# Patient Record
Sex: FEMALE | Race: WHITE | Employment: OTHER | URBAN - METROPOLITAN AREA
[De-identification: names, ages, dates, MRNs, and addresses within clinical notes are randomized per-mention and may not be internally consistent; named-entity substitution may affect disease eponyms.]

---

## 2018-05-28 PROBLEM — J96.00 ACUTE RESPIRATORY FAILURE (HCC): Status: ACTIVE | Noted: 2018-05-28

## 2018-06-01 ENCOUNTER — HOSPITAL ENCOUNTER (OUTPATIENT)
Dept: OTHER | Age: 73
Discharge: HOME | End: 2018-06-01
Attending: INTERNAL MEDICINE | Admitting: INTERNAL MEDICINE

## 2018-06-19 LAB
T3 FREE: 2.5 PG/ML (ref 2.3–4.2)
TSH HIGH SENSITIVITY: 2.22 UIU/ML (ref 0.27–4.2)

## 2018-06-20 LAB — T4 TOTAL: 6.23 UG/DL

## 2018-07-02 ENCOUNTER — OUTSIDE SERVICES (OUTPATIENT)
Dept: INTERNAL MEDICINE CLINIC | Age: 73
End: 2018-07-02

## 2018-07-02 DIAGNOSIS — Z79.4 TYPE 2 DIABETES MELLITUS WITH COMPLICATION, WITH LONG-TERM CURRENT USE OF INSULIN (HCC): Primary | ICD-10-CM

## 2018-07-02 DIAGNOSIS — E11.8 TYPE 2 DIABETES MELLITUS WITH COMPLICATION, WITH LONG-TERM CURRENT USE OF INSULIN (HCC): Primary | ICD-10-CM

## 2018-07-02 PROCEDURE — 99308 SBSQ NF CARE LOW MDM 20: CPT | Performed by: INTERNAL MEDICINE

## 2018-07-02 NOTE — PROGRESS NOTES
PROGRESS NOTE  ___________________________    Vivienne Rodriguez's  is 1945  SEEN ON 2018  ___________________________    S:   Patient is seen today and discussed with the nurses. Nurses called to report that patient fasting blood sugars are low on 2018 blood sugar was 56, on 2018 it was 69. Other times it was 65 and 67. Patient is on Tradjenta 5 mg daily, metformin 500 mg twice a day and insulin glargine 30 units at bedtime      Vital signs: stable as per [facility]  GENERAL:  Alert, oriented, pleasant, in no apparent distress. HEENT:  Conjunctiva pink, no scleral icterus. ENT clear. NECK:  Supple. No jugular venous distention noted. No masses felt,  CARDIOVASCULAR:  Normal S1 and S2    PULMONARY:  No respiratory distress. No wheezes or rales. ABDOMEN:  Soft and non-tender,no masses  or organomegaly. EXTREMITIES:  No cyanosis, clubbing, or significant edema. SKIN: Skin is warm and dry. NEUROLOGICAL:  Cranial nerves II through XII are grossly intact. Assessment:  -Diabetes mellitus on insulin and oral medications   -Hypoglycemia in the early hours   -Hyperlipidemia   -History of CVA        Plan:  Medication were reviewed  Continue patient on metformin and Tradjenta right now  Decrease insulin glargine from 30 units to 20 units at at bedtime  Continue to monitor blood sugars frequently  Continue rest of the medicine treatment.

## 2018-08-01 ENCOUNTER — HOSPITAL ENCOUNTER (OUTPATIENT)
Dept: OTHER | Age: 73
Discharge: HOME | End: 2018-08-31
Attending: INTERNAL MEDICINE | Admitting: INTERNAL MEDICINE

## 2018-08-07 LAB
BACTERIA: NEGATIVE /HPF
BILIRUBIN URINE: ABNORMAL MG/DL
BLOOD, URINE: NEGATIVE
CALCIUM OXALATE CRYSTALS: ABNORMAL /HPF
CLARITY: ABNORMAL
COLOR: YELLOW
CULTURE: NORMAL
GLUCOSE, URINE: 50 MG/DL
ICTOTEST: NEGATIVE
KETONES, URINE: NEGATIVE MG/DL
LEUKOCYTE ESTERASE, URINE: NEGATIVE
Lab: NORMAL
MUCUS: ABNORMAL HPF
NITRITE URINE, QUANTITATIVE: NEGATIVE
PH, URINE: 5 (ref 5–8)
PROTEIN UA: NEGATIVE MG/DL
RBC URINE: ABNORMAL /HPF (ref 0–6)
REPORT STATUS: NORMAL
SPECIFIC GRAVITY UA: 1.03 (ref 1–1.03)
SPECIMEN: NORMAL
SQUAMOUS EPITHELIAL: 1 /HPF
TOTAL COLONY COUNT: NORMAL
TRICHOMONAS: ABNORMAL /HPF
UROBILINOGEN, URINE: NORMAL MG/DL (ref 0.2–1)
WBC UA: ABNORMAL /HPF (ref 0–5)

## 2018-09-01 ENCOUNTER — HOSPITAL ENCOUNTER (OUTPATIENT)
Dept: OTHER | Age: 73
Discharge: HOME | End: 2018-09-01
Attending: INTERNAL MEDICINE | Admitting: INTERNAL MEDICINE

## 2018-09-11 LAB
ALBUMIN SERPL-MCNC: 3.4 GM/DL (ref 3.4–5)
ALP BLD-CCNC: 68 IU/L (ref 40–128)
ALT SERPL-CCNC: 8 U/L (ref 10–40)
ANION GAP SERPL CALCULATED.3IONS-SCNC: 10 MMOL/L (ref 4–16)
AST SERPL-CCNC: 11 IU/L (ref 15–37)
BILIRUB SERPL-MCNC: 0.3 MG/DL (ref 0–1)
BUN BLDV-MCNC: 7 MG/DL (ref 6–23)
CALCIUM SERPL-MCNC: 8.6 MG/DL (ref 8.3–10.6)
CHLORIDE BLD-SCNC: 105 MMOL/L (ref 99–110)
CO2: 32 MMOL/L (ref 21–32)
CREAT SERPL-MCNC: 0.7 MG/DL (ref 0.6–1.1)
ESTIMATED AVERAGE GLUCOSE: 140 MG/DL
GFR AFRICAN AMERICAN: >60 ML/MIN/1.73M2
GFR NON-AFRICAN AMERICAN: >60 ML/MIN/1.73M2
GLUCOSE BLD-MCNC: 53 MG/DL (ref 70–99)
HBA1C MFR BLD: 6.5 % (ref 4.2–6.3)
HCT VFR BLD CALC: 40.1 % (ref 37–47)
HEMOGLOBIN: 12.5 GM/DL (ref 12.5–16)
MCH RBC QN AUTO: 31.5 PG (ref 27–31)
MCHC RBC AUTO-ENTMCNC: 31.2 % (ref 32–36)
MCV RBC AUTO: 101 FL (ref 78–100)
PDW BLD-RTO: 13.7 % (ref 11.7–14.9)
PLATELET # BLD: 258 K/CU MM (ref 140–440)
PMV BLD AUTO: 10.7 FL (ref 7.5–11.1)
POTASSIUM SERPL-SCNC: 3.3 MMOL/L (ref 3.5–5.1)
RBC # BLD: 3.97 M/CU MM (ref 4.2–5.4)
SODIUM BLD-SCNC: 147 MMOL/L (ref 135–145)
TOTAL PROTEIN: 5.3 GM/DL (ref 6.4–8.2)
WBC # BLD: 6.9 K/CU MM (ref 4–10.5)

## 2018-10-02 ENCOUNTER — HOSPITAL ENCOUNTER (OUTPATIENT)
Age: 73
Discharge: HOME | End: 2018-10-02
Payer: MEDICARE

## 2018-10-02 LAB
ANION GAP SERPL CALCULATED.3IONS-SCNC: 10 MMOL/L (ref 4–16)
BUN BLDV-MCNC: 7 MG/DL (ref 6–23)
CALCIUM SERPL-MCNC: 8.8 MG/DL (ref 8.3–10.6)
CHLORIDE BLD-SCNC: 105 MMOL/L (ref 99–110)
CO2: 30 MMOL/L (ref 21–32)
CREAT SERPL-MCNC: 0.8 MG/DL (ref 0.6–1.1)
GFR AFRICAN AMERICAN: >60 ML/MIN/1.73M2
GFR NON-AFRICAN AMERICAN: >60 ML/MIN/1.73M2
GLUCOSE BLD-MCNC: 73 MG/DL (ref 70–99)
POTASSIUM SERPL-SCNC: 3.9 MMOL/L (ref 3.5–5.1)
SODIUM BLD-SCNC: 145 MMOL/L (ref 135–145)

## 2018-10-02 PROCEDURE — 80048 BASIC METABOLIC PNL TOTAL CA: CPT

## 2018-10-02 PROCEDURE — 36415 COLL VENOUS BLD VENIPUNCTURE: CPT

## 2018-10-10 ENCOUNTER — HOSPITAL ENCOUNTER (OUTPATIENT)
Age: 73
Setting detail: SPECIMEN
Discharge: HOME | End: 2018-10-10
Payer: MEDICARE

## 2018-10-10 LAB
RAPID INFLUENZA  B AGN: NEGATIVE
RAPID INFLUENZA A AGN: NEGATIVE

## 2018-10-10 PROCEDURE — 87804 INFLUENZA ASSAY W/OPTIC: CPT

## 2018-10-19 ENCOUNTER — HOSPITAL ENCOUNTER (OUTPATIENT)
Age: 73
Discharge: HOME | End: 2018-10-19
Payer: MEDICARE

## 2018-10-19 LAB — LEGIONELLA URINARY AG: NEGATIVE

## 2018-10-19 PROCEDURE — 36415 COLL VENOUS BLD VENIPUNCTURE: CPT

## 2018-10-19 PROCEDURE — 87449 NOS EACH ORGANISM AG IA: CPT

## 2018-10-19 PROCEDURE — 86738 MYCOPLASMA ANTIBODY: CPT

## 2018-10-21 LAB
MYCOPLASMA PNEUMONIAE IGG: 0.17
MYCOPLASMA PNEUMONIAE IGM: 0.08

## 2018-12-11 ENCOUNTER — HOSPITAL ENCOUNTER (OUTPATIENT)
Age: 73
Setting detail: SPECIMEN
Discharge: HOME | End: 2018-12-11
Payer: COMMERCIAL

## 2018-12-11 LAB
ANION GAP SERPL CALCULATED.3IONS-SCNC: 13 MMOL/L (ref 4–16)
BUN BLDV-MCNC: 9 MG/DL (ref 6–23)
CALCIUM SERPL-MCNC: 8.6 MG/DL (ref 8.3–10.6)
CHLORIDE BLD-SCNC: 101 MMOL/L (ref 99–110)
CO2: 29 MMOL/L (ref 21–32)
CREAT SERPL-MCNC: 0.7 MG/DL (ref 0.6–1.1)
ESTIMATED AVERAGE GLUCOSE: 157 MG/DL
GFR AFRICAN AMERICAN: >60 ML/MIN/1.73M2
GFR NON-AFRICAN AMERICAN: >60 ML/MIN/1.73M2
GLUCOSE BLD-MCNC: 116 MG/DL (ref 70–99)
HBA1C MFR BLD: 7.1 % (ref 4.2–6.3)
POTASSIUM SERPL-SCNC: 3.7 MMOL/L (ref 3.5–5.1)
SODIUM BLD-SCNC: 143 MMOL/L (ref 135–145)

## 2018-12-11 PROCEDURE — 36415 COLL VENOUS BLD VENIPUNCTURE: CPT

## 2018-12-11 PROCEDURE — 80048 BASIC METABOLIC PNL TOTAL CA: CPT

## 2018-12-11 PROCEDURE — 83036 HEMOGLOBIN GLYCOSYLATED A1C: CPT

## 2018-12-12 ENCOUNTER — HOSPITAL ENCOUNTER (OUTPATIENT)
Age: 73
Setting detail: SPECIMEN
Discharge: HOME | End: 2018-12-12
Payer: COMMERCIAL

## 2018-12-12 PROCEDURE — 87071 CULTURE AEROBIC QUANT OTHER: CPT

## 2018-12-12 PROCEDURE — 87077 CULTURE AEROBIC IDENTIFY: CPT

## 2018-12-12 PROCEDURE — 87073 CULTURE BACTERIA ANAEROBIC: CPT

## 2018-12-12 PROCEDURE — 87186 SC STD MICRODIL/AGAR DIL: CPT

## 2018-12-16 LAB
CULTURE: NORMAL
Lab: NORMAL
ORGANISM: NORMAL
REPORT STATUS: NORMAL
SPECIMEN: NORMAL

## 2019-03-10 ENCOUNTER — HOSPITAL ENCOUNTER (OUTPATIENT)
Age: 74
Setting detail: SPECIMEN
Discharge: HOME | End: 2019-03-10
Payer: COMMERCIAL

## 2019-03-10 PROCEDURE — 87086 URINE CULTURE/COLONY COUNT: CPT

## 2019-03-11 LAB
CULTURE: ABNORMAL
Lab: ABNORMAL
SPECIMEN: ABNORMAL
TOTAL COLONY COUNT: ABNORMAL

## 2019-03-12 ENCOUNTER — HOSPITAL ENCOUNTER (OUTPATIENT)
Age: 74
Setting detail: SPECIMEN
Discharge: HOME | End: 2019-03-12
Payer: COMMERCIAL

## 2019-03-12 LAB
ANION GAP SERPL CALCULATED.3IONS-SCNC: 7 MMOL/L (ref 4–16)
BUN BLDV-MCNC: 8 MG/DL (ref 6–23)
CALCIUM SERPL-MCNC: 8.8 MG/DL (ref 8.3–10.6)
CHLORIDE BLD-SCNC: 105 MMOL/L (ref 99–110)
CO2: 34 MMOL/L (ref 21–32)
CREAT SERPL-MCNC: 0.7 MG/DL (ref 0.6–1.1)
ESTIMATED AVERAGE GLUCOSE: 183 MG/DL
GFR AFRICAN AMERICAN: >60 ML/MIN/1.73M2
GFR NON-AFRICAN AMERICAN: >60 ML/MIN/1.73M2
GLUCOSE BLD-MCNC: 98 MG/DL (ref 70–99)
HBA1C MFR BLD: 8 % (ref 4.2–6.3)
POTASSIUM SERPL-SCNC: 3.9 MMOL/L (ref 3.5–5.1)
SODIUM BLD-SCNC: 146 MMOL/L (ref 135–145)

## 2019-03-12 PROCEDURE — 83036 HEMOGLOBIN GLYCOSYLATED A1C: CPT

## 2019-03-12 PROCEDURE — 81001 URINALYSIS AUTO W/SCOPE: CPT

## 2019-03-12 PROCEDURE — 80048 BASIC METABOLIC PNL TOTAL CA: CPT

## 2019-03-12 PROCEDURE — 87186 SC STD MICRODIL/AGAR DIL: CPT

## 2019-03-12 PROCEDURE — 87077 CULTURE AEROBIC IDENTIFY: CPT

## 2019-03-12 PROCEDURE — 87086 URINE CULTURE/COLONY COUNT: CPT

## 2019-03-12 PROCEDURE — 36415 COLL VENOUS BLD VENIPUNCTURE: CPT

## 2019-03-16 LAB
CULTURE: ABNORMAL
Lab: ABNORMAL
SPECIMEN: ABNORMAL
TOTAL COLONY COUNT: ABNORMAL

## 2019-03-18 LAB
BACTERIA: ABNORMAL /HPF
BILIRUBIN URINE: NEGATIVE MG/DL
BLOOD, URINE: ABNORMAL
CLARITY: ABNORMAL
COLOR: YELLOW
GLUCOSE, URINE: >500 MG/DL
KETONES, URINE: NEGATIVE MG/DL
LEUKOCYTE ESTERASE, URINE: ABNORMAL
NITRITE URINE, QUANTITATIVE: NEGATIVE
PH, URINE: 5 (ref 5–8)
PROTEIN UA: NEGATIVE MG/DL
RBC URINE: 154 /HPF (ref 0–6)
SPECIFIC GRAVITY UA: 1.01 (ref 1–1.03)
SQUAMOUS EPITHELIAL: 8 /HPF
TRICHOMONAS: ABNORMAL /HPF
UROBILINOGEN, URINE: NORMAL MG/DL (ref 0.2–1)
WBC CLUMP: ABNORMAL /HPF
WBC UA: 192 /HPF (ref 0–5)
YEAST: ABNORMAL /HPF

## 2019-04-01 ENCOUNTER — HOSPITAL ENCOUNTER (OUTPATIENT)
Age: 74
Setting detail: SPECIMEN
Discharge: HOME | End: 2019-04-01
Payer: COMMERCIAL

## 2019-04-04 ENCOUNTER — HOSPITAL ENCOUNTER (OUTPATIENT)
Age: 74
Setting detail: SPECIMEN
Discharge: HOME | End: 2019-04-04
Payer: COMMERCIAL

## 2019-04-04 PROCEDURE — 87186 SC STD MICRODIL/AGAR DIL: CPT

## 2019-04-04 PROCEDURE — 87077 CULTURE AEROBIC IDENTIFY: CPT

## 2019-04-04 PROCEDURE — 87086 URINE CULTURE/COLONY COUNT: CPT

## 2019-04-04 PROCEDURE — 81001 URINALYSIS AUTO W/SCOPE: CPT

## 2019-04-05 LAB
BACTERIA: ABNORMAL /HPF
BILIRUBIN URINE: NEGATIVE MG/DL
BLOOD, URINE: ABNORMAL
CLARITY: ABNORMAL
COLOR: YELLOW
GLUCOSE, URINE: NEGATIVE MG/DL
KETONES, URINE: NEGATIVE MG/DL
LEUKOCYTE ESTERASE, URINE: ABNORMAL
MUCUS: ABNORMAL HPF
NITRITE URINE, QUANTITATIVE: NEGATIVE
PH, URINE: 5 (ref 5–8)
PROTEIN UA: NEGATIVE MG/DL
RBC URINE: 18 /HPF (ref 0–6)
SPECIFIC GRAVITY UA: 1.01 (ref 1–1.03)
SQUAMOUS EPITHELIAL: 4 /HPF
TRANSITIONAL EPITHELIAL: <1 /HPF
TRICHOMONAS: ABNORMAL /HPF
UROBILINOGEN, URINE: NORMAL MG/DL (ref 0.2–1)
WBC CLUMP: ABNORMAL /HPF
WBC UA: 46 /HPF (ref 0–5)

## 2019-04-06 LAB
CULTURE: ABNORMAL
Lab: ABNORMAL
SPECIMEN: ABNORMAL
TOTAL COLONY COUNT: ABNORMAL

## 2019-04-22 ENCOUNTER — HOSPITAL ENCOUNTER (OUTPATIENT)
Age: 74
Setting detail: SPECIMEN
Discharge: HOME | End: 2019-04-22
Payer: COMMERCIAL

## 2019-04-22 PROCEDURE — 87186 SC STD MICRODIL/AGAR DIL: CPT

## 2019-04-22 PROCEDURE — 87086 URINE CULTURE/COLONY COUNT: CPT

## 2019-04-22 PROCEDURE — 87077 CULTURE AEROBIC IDENTIFY: CPT

## 2019-04-25 LAB
CULTURE: ABNORMAL
Lab: ABNORMAL
SPECIMEN: ABNORMAL
TOTAL COLONY COUNT: ABNORMAL

## 2019-06-11 ENCOUNTER — HOSPITAL ENCOUNTER (OUTPATIENT)
Age: 74
Discharge: HOME | End: 2019-06-11
Payer: COMMERCIAL

## 2019-06-11 LAB
ANION GAP SERPL CALCULATED.3IONS-SCNC: 10 MMOL/L (ref 4–16)
BUN BLDV-MCNC: 9 MG/DL (ref 6–23)
CALCIUM SERPL-MCNC: 8.5 MG/DL (ref 8.3–10.6)
CHLORIDE BLD-SCNC: 107 MMOL/L (ref 99–110)
CO2: 27 MMOL/L (ref 21–32)
CREAT SERPL-MCNC: 0.8 MG/DL (ref 0.6–1.1)
ESTIMATED AVERAGE GLUCOSE: 180 MG/DL
GFR AFRICAN AMERICAN: >60 ML/MIN/1.73M2
GFR NON-AFRICAN AMERICAN: >60 ML/MIN/1.73M2
GLUCOSE BLD-MCNC: 161 MG/DL (ref 70–99)
HBA1C MFR BLD: 7.9 % (ref 4.2–6.3)
POTASSIUM SERPL-SCNC: 3.8 MMOL/L (ref 3.5–5.1)
SODIUM BLD-SCNC: 144 MMOL/L (ref 135–145)

## 2019-06-11 PROCEDURE — 36415 COLL VENOUS BLD VENIPUNCTURE: CPT

## 2019-06-11 PROCEDURE — 83036 HEMOGLOBIN GLYCOSYLATED A1C: CPT

## 2019-06-11 PROCEDURE — 80048 BASIC METABOLIC PNL TOTAL CA: CPT

## 2019-09-10 ENCOUNTER — HOSPITAL ENCOUNTER (OUTPATIENT)
Age: 74
Setting detail: SPECIMEN
Discharge: HOME | End: 2019-09-10
Payer: COMMERCIAL

## 2019-09-10 LAB
ALBUMIN SERPL-MCNC: 3.3 GM/DL (ref 3.4–5)
ALP BLD-CCNC: 68 IU/L (ref 40–128)
ALT SERPL-CCNC: 8 U/L (ref 10–40)
ANION GAP SERPL CALCULATED.3IONS-SCNC: 12 MMOL/L (ref 4–16)
AST SERPL-CCNC: 12 IU/L (ref 15–37)
BILIRUB SERPL-MCNC: 0.2 MG/DL (ref 0–1)
BUN BLDV-MCNC: 10 MG/DL (ref 6–23)
CALCIUM SERPL-MCNC: 8.8 MG/DL (ref 8.3–10.6)
CHLORIDE BLD-SCNC: 105 MMOL/L (ref 99–110)
CO2: 29 MMOL/L (ref 21–32)
CREAT SERPL-MCNC: 0.8 MG/DL (ref 0.6–1.1)
ESTIMATED AVERAGE GLUCOSE: 163 MG/DL
GFR AFRICAN AMERICAN: >60 ML/MIN/1.73M2
GFR NON-AFRICAN AMERICAN: >60 ML/MIN/1.73M2
GLUCOSE BLD-MCNC: 118 MG/DL (ref 70–99)
HBA1C MFR BLD: 7.3 % (ref 4.2–6.3)
HCT VFR BLD CALC: 45 % (ref 37–47)
HEMOGLOBIN: 12.5 GM/DL (ref 12.5–16)
MCH RBC QN AUTO: 29.8 PG (ref 27–31)
MCHC RBC AUTO-ENTMCNC: 27.8 % (ref 32–36)
MCV RBC AUTO: 107.1 FL (ref 78–100)
PDW BLD-RTO: 15.4 % (ref 11.7–14.9)
PLATELET # BLD: 216 K/CU MM (ref 140–440)
PMV BLD AUTO: 10.8 FL (ref 7.5–11.1)
POTASSIUM SERPL-SCNC: 4 MMOL/L (ref 3.5–5.1)
RBC # BLD: 4.2 M/CU MM (ref 4.2–5.4)
SODIUM BLD-SCNC: 146 MMOL/L (ref 135–145)
TOTAL PROTEIN: 5.9 GM/DL (ref 6.4–8.2)
WBC # BLD: 6.3 K/CU MM (ref 4–10.5)

## 2019-09-10 PROCEDURE — 85027 COMPLETE CBC AUTOMATED: CPT

## 2019-09-10 PROCEDURE — 80053 COMPREHEN METABOLIC PANEL: CPT

## 2019-09-10 PROCEDURE — 83036 HEMOGLOBIN GLYCOSYLATED A1C: CPT

## 2019-09-10 PROCEDURE — 36415 COLL VENOUS BLD VENIPUNCTURE: CPT

## 2019-11-27 ENCOUNTER — HOSPITAL ENCOUNTER (OUTPATIENT)
Age: 74
Setting detail: SPECIMEN
Discharge: HOME | End: 2019-11-27
Payer: COMMERCIAL

## 2019-11-27 PROCEDURE — 87804 INFLUENZA ASSAY W/OPTIC: CPT

## 2019-11-28 LAB
RAPID INFLUENZA  B AGN: NEGATIVE
RAPID INFLUENZA A AGN: NEGATIVE

## 2019-12-10 ENCOUNTER — HOSPITAL ENCOUNTER (OUTPATIENT)
Age: 74
Discharge: HOME | End: 2019-12-10
Payer: COMMERCIAL

## 2019-12-10 LAB
ANION GAP SERPL CALCULATED.3IONS-SCNC: 16 MMOL/L (ref 4–16)
BUN BLDV-MCNC: 15 MG/DL (ref 6–23)
CALCIUM SERPL-MCNC: 8.7 MG/DL (ref 8.3–10.6)
CHLORIDE BLD-SCNC: 103 MMOL/L (ref 99–110)
CO2: 25 MMOL/L (ref 21–32)
CREAT SERPL-MCNC: 0.9 MG/DL (ref 0.6–1.1)
ESTIMATED AVERAGE GLUCOSE: 166 MG/DL
GFR AFRICAN AMERICAN: >60 ML/MIN/1.73M2
GFR NON-AFRICAN AMERICAN: >60 ML/MIN/1.73M2
GLUCOSE BLD-MCNC: 134 MG/DL (ref 70–99)
HBA1C MFR BLD: 7.4 % (ref 4.2–6.3)
POTASSIUM SERPL-SCNC: 3.6 MMOL/L (ref 3.5–5.1)
SODIUM BLD-SCNC: 144 MMOL/L (ref 135–145)

## 2019-12-10 PROCEDURE — 36415 COLL VENOUS BLD VENIPUNCTURE: CPT

## 2019-12-10 PROCEDURE — 83036 HEMOGLOBIN GLYCOSYLATED A1C: CPT

## 2019-12-10 PROCEDURE — 80048 BASIC METABOLIC PNL TOTAL CA: CPT

## 2020-01-01 ENCOUNTER — HOSPITAL ENCOUNTER (OUTPATIENT)
Age: 75
Setting detail: SPECIMEN
Discharge: HOME | End: 2020-01-01
Payer: COMMERCIAL

## 2020-01-01 PROCEDURE — 87804 INFLUENZA ASSAY W/OPTIC: CPT

## 2020-01-03 LAB
RAPID INFLUENZA  B AGN: NEGATIVE
RAPID INFLUENZA A AGN: NEGATIVE

## 2020-03-17 ENCOUNTER — HOSPITAL ENCOUNTER (OUTPATIENT)
Age: 75
Setting detail: SPECIMEN
Discharge: HOME | End: 2020-03-17
Payer: COMMERCIAL

## 2020-03-17 LAB
ANION GAP SERPL CALCULATED.3IONS-SCNC: 14 MMOL/L (ref 4–16)
BUN BLDV-MCNC: 5 MG/DL (ref 6–23)
CALCIUM SERPL-MCNC: 8.2 MG/DL (ref 8.3–10.6)
CHLORIDE BLD-SCNC: 110 MMOL/L (ref 99–110)
CO2: 23 MMOL/L (ref 21–32)
CREAT SERPL-MCNC: 0.7 MG/DL (ref 0.6–1.1)
ESTIMATED AVERAGE GLUCOSE: 134 MG/DL
GFR AFRICAN AMERICAN: >60 ML/MIN/1.73M2
GFR NON-AFRICAN AMERICAN: >60 ML/MIN/1.73M2
GLUCOSE BLD-MCNC: 98 MG/DL (ref 70–99)
HBA1C MFR BLD: 6.3 % (ref 4.2–6.3)
POTASSIUM SERPL-SCNC: 4.4 MMOL/L (ref 3.5–5.1)
SODIUM BLD-SCNC: 147 MMOL/L (ref 135–145)

## 2020-03-17 PROCEDURE — 80048 BASIC METABOLIC PNL TOTAL CA: CPT

## 2020-03-17 PROCEDURE — 36415 COLL VENOUS BLD VENIPUNCTURE: CPT

## 2020-03-17 PROCEDURE — 83036 HEMOGLOBIN GLYCOSYLATED A1C: CPT

## 2020-07-07 ENCOUNTER — HOSPITAL ENCOUNTER (OUTPATIENT)
Age: 75
Setting detail: SPECIMEN
Discharge: HOME | End: 2020-07-07
Payer: COMMERCIAL

## 2020-07-07 LAB
ANION GAP SERPL CALCULATED.3IONS-SCNC: 8 MMOL/L (ref 4–16)
BUN BLDV-MCNC: 15 MG/DL (ref 6–23)
CALCIUM SERPL-MCNC: 8.6 MG/DL (ref 8.3–10.6)
CHLORIDE BLD-SCNC: 109 MMOL/L (ref 99–110)
CO2: 27 MMOL/L (ref 21–32)
CREAT SERPL-MCNC: 0.8 MG/DL (ref 0.6–1.1)
ESTIMATED AVERAGE GLUCOSE: 146 MG/DL
GFR AFRICAN AMERICAN: >60 ML/MIN/1.73M2
GFR NON-AFRICAN AMERICAN: >60 ML/MIN/1.73M2
GLUCOSE BLD-MCNC: 69 MG/DL (ref 70–99)
HBA1C MFR BLD: 6.7 % (ref 4.2–6.3)
POTASSIUM SERPL-SCNC: 3.8 MMOL/L (ref 3.5–5.1)
SODIUM BLD-SCNC: 144 MMOL/L (ref 135–145)

## 2020-07-07 PROCEDURE — 80048 BASIC METABOLIC PNL TOTAL CA: CPT

## 2020-07-07 PROCEDURE — 83036 HEMOGLOBIN GLYCOSYLATED A1C: CPT

## 2020-07-07 PROCEDURE — 36415 COLL VENOUS BLD VENIPUNCTURE: CPT

## 2020-08-13 ENCOUNTER — HOSPITAL ENCOUNTER (OUTPATIENT)
Age: 75
Setting detail: SPECIMEN
Discharge: HOME | End: 2020-08-13
Payer: COMMERCIAL

## 2020-08-13 PROCEDURE — U0002 COVID-19 LAB TEST NON-CDC: HCPCS

## 2020-08-14 LAB
SARS-COV-2: NOT DETECTED
SOURCE: NORMAL

## 2020-09-15 ENCOUNTER — HOSPITAL ENCOUNTER (OUTPATIENT)
Age: 75
Setting detail: SPECIMEN
Discharge: HOME | End: 2020-09-15
Payer: COMMERCIAL

## 2020-09-15 LAB
ALBUMIN SERPL-MCNC: 3.6 GM/DL (ref 3.4–5)
ALP BLD-CCNC: 64 IU/L (ref 40–128)
ALT SERPL-CCNC: 6 U/L (ref 10–40)
ANION GAP SERPL CALCULATED.3IONS-SCNC: 13 MMOL/L (ref 4–16)
AST SERPL-CCNC: 11 IU/L (ref 15–37)
BILIRUB SERPL-MCNC: 0.4 MG/DL (ref 0–1)
BUN BLDV-MCNC: 10 MG/DL (ref 6–23)
CALCIUM SERPL-MCNC: 8.5 MG/DL (ref 8.3–10.6)
CHLORIDE BLD-SCNC: 107 MMOL/L (ref 99–110)
CO2: 25 MMOL/L (ref 21–32)
CREAT SERPL-MCNC: 0.9 MG/DL (ref 0.6–1.1)
ESTIMATED AVERAGE GLUCOSE: 148 MG/DL
GFR AFRICAN AMERICAN: >60 ML/MIN/1.73M2
GFR NON-AFRICAN AMERICAN: >60 ML/MIN/1.73M2
GLUCOSE BLD-MCNC: 104 MG/DL (ref 70–99)
HBA1C MFR BLD: 6.8 % (ref 4.2–6.3)
HCT VFR BLD CALC: 44.8 % (ref 37–47)
HEMOGLOBIN: 13.6 GM/DL (ref 12.5–16)
MCH RBC QN AUTO: 31.3 PG (ref 27–31)
MCHC RBC AUTO-ENTMCNC: 30.4 % (ref 32–36)
MCV RBC AUTO: 103 FL (ref 78–100)
PDW BLD-RTO: 13.9 % (ref 11.7–14.9)
PLATELET # BLD: 166 K/CU MM (ref 140–440)
PMV BLD AUTO: 11.1 FL (ref 7.5–11.1)
POTASSIUM SERPL-SCNC: 3.7 MMOL/L (ref 3.5–5.1)
RBC # BLD: 4.35 M/CU MM (ref 4.2–5.4)
SODIUM BLD-SCNC: 145 MMOL/L (ref 135–145)
TOTAL PROTEIN: 5.9 GM/DL (ref 6.4–8.2)
WBC # BLD: 5.7 K/CU MM (ref 4–10.5)

## 2020-09-15 PROCEDURE — 80053 COMPREHEN METABOLIC PANEL: CPT

## 2020-09-15 PROCEDURE — 85027 COMPLETE CBC AUTOMATED: CPT

## 2020-09-15 PROCEDURE — 36415 COLL VENOUS BLD VENIPUNCTURE: CPT

## 2020-09-15 PROCEDURE — 83036 HEMOGLOBIN GLYCOSYLATED A1C: CPT

## 2020-12-15 ENCOUNTER — HOSPITAL ENCOUNTER (OUTPATIENT)
Age: 75
Setting detail: SPECIMEN
Discharge: HOME | End: 2020-12-15
Payer: COMMERCIAL

## 2020-12-15 LAB
ANION GAP SERPL CALCULATED.3IONS-SCNC: 11 MMOL/L (ref 4–16)
BUN BLDV-MCNC: 11 MG/DL (ref 6–23)
CALCIUM SERPL-MCNC: 8.5 MG/DL (ref 8.3–10.6)
CHLORIDE BLD-SCNC: 108 MMOL/L (ref 99–110)
CO2: 27 MMOL/L (ref 21–32)
CREAT SERPL-MCNC: 0.9 MG/DL (ref 0.6–1.1)
ESTIMATED AVERAGE GLUCOSE: 143 MG/DL
GFR AFRICAN AMERICAN: >60 ML/MIN/1.73M2
GFR NON-AFRICAN AMERICAN: >60 ML/MIN/1.73M2
GLUCOSE BLD-MCNC: 136 MG/DL (ref 70–99)
HBA1C MFR BLD: 6.6 % (ref 4.2–6.3)
POTASSIUM SERPL-SCNC: 4.2 MMOL/L (ref 3.5–5.1)
SODIUM BLD-SCNC: 146 MMOL/L (ref 135–145)

## 2020-12-15 PROCEDURE — 80048 BASIC METABOLIC PNL TOTAL CA: CPT

## 2020-12-15 PROCEDURE — 83036 HEMOGLOBIN GLYCOSYLATED A1C: CPT

## 2020-12-15 PROCEDURE — 36415 COLL VENOUS BLD VENIPUNCTURE: CPT

## 2021-01-02 ENCOUNTER — HOSPITAL ENCOUNTER (OUTPATIENT)
Age: 76
Setting detail: SPECIMEN
Discharge: HOME | End: 2021-01-02
Payer: COMMERCIAL

## 2021-01-02 PROCEDURE — 87086 URINE CULTURE/COLONY COUNT: CPT

## 2021-01-02 PROCEDURE — 87186 SC STD MICRODIL/AGAR DIL: CPT

## 2021-01-04 LAB
CULTURE: ABNORMAL
CULTURE: ABNORMAL
Lab: ABNORMAL
SPECIMEN: ABNORMAL

## 2021-02-20 ENCOUNTER — HOSPITAL ENCOUNTER (OUTPATIENT)
Age: 76
Setting detail: SPECIMEN
Discharge: HOME | End: 2021-02-20
Payer: COMMERCIAL

## 2021-02-20 PROCEDURE — 87077 CULTURE AEROBIC IDENTIFY: CPT

## 2021-02-20 PROCEDURE — 87186 SC STD MICRODIL/AGAR DIL: CPT

## 2021-02-20 PROCEDURE — 87086 URINE CULTURE/COLONY COUNT: CPT

## 2021-02-22 LAB
CULTURE: ABNORMAL
Lab: ABNORMAL
SPECIMEN: ABNORMAL

## 2021-03-12 ENCOUNTER — HOSPITAL ENCOUNTER (OUTPATIENT)
Age: 76
Setting detail: SPECIMEN
Discharge: HOME | End: 2021-03-12
Payer: COMMERCIAL

## 2021-03-12 LAB
ALBUMIN SERPL-MCNC: 3.1 GM/DL (ref 3.4–5)
ALP BLD-CCNC: 62 IU/L (ref 40–128)
ALT SERPL-CCNC: 7 U/L (ref 10–40)
AMMONIA: 21 UMOL/L (ref 11–51)
ANION GAP SERPL CALCULATED.3IONS-SCNC: 6 MMOL/L (ref 4–16)
AST SERPL-CCNC: 10 IU/L (ref 15–37)
BASOPHILS ABSOLUTE: 0 K/CU MM
BASOPHILS RELATIVE PERCENT: 0.7 % (ref 0–1)
BILIRUB SERPL-MCNC: 0.4 MG/DL (ref 0–1)
BUN BLDV-MCNC: 6 MG/DL (ref 6–23)
CALCIUM SERPL-MCNC: 8.2 MG/DL (ref 8.3–10.6)
CHLORIDE BLD-SCNC: 108 MMOL/L (ref 99–110)
CO2: 29 MMOL/L (ref 21–32)
CREAT SERPL-MCNC: 0.9 MG/DL (ref 0.6–1.1)
DIFFERENTIAL TYPE: ABNORMAL
EOSINOPHILS ABSOLUTE: 0.2 K/CU MM
EOSINOPHILS RELATIVE PERCENT: 5.7 % (ref 0–3)
GFR AFRICAN AMERICAN: >60 ML/MIN/1.73M2
GFR NON-AFRICAN AMERICAN: >60 ML/MIN/1.73M2
GLUCOSE BLD-MCNC: 128 MG/DL (ref 70–99)
HCT VFR BLD CALC: 40.9 % (ref 37–47)
HEMOGLOBIN: 13 GM/DL (ref 12.5–16)
IMMATURE NEUTROPHIL %: 0.2 % (ref 0–0.43)
LYMPHOCYTES ABSOLUTE: 2 K/CU MM
LYMPHOCYTES RELATIVE PERCENT: 50.1 % (ref 24–44)
MCH RBC QN AUTO: 31.5 PG (ref 27–31)
MCHC RBC AUTO-ENTMCNC: 31.8 % (ref 32–36)
MCV RBC AUTO: 99 FL (ref 78–100)
MONOCYTES ABSOLUTE: 0.4 K/CU MM
MONOCYTES RELATIVE PERCENT: 9.2 % (ref 0–4)
NUCLEATED RBC %: 0 %
PDW BLD-RTO: 13.3 % (ref 11.7–14.9)
PLATELET # BLD: 171 K/CU MM (ref 140–440)
PMV BLD AUTO: 11 FL (ref 7.5–11.1)
POTASSIUM SERPL-SCNC: 3.5 MMOL/L (ref 3.5–5.1)
RBC # BLD: 4.13 M/CU MM (ref 4.2–5.4)
SEGMENTED NEUTROPHILS ABSOLUTE COUNT: 1.4 K/CU MM
SEGMENTED NEUTROPHILS RELATIVE PERCENT: 34.1 % (ref 36–66)
SODIUM BLD-SCNC: 143 MMOL/L (ref 135–145)
TOTAL IMMATURE NEUTOROPHIL: 0.01 K/CU MM
TOTAL NUCLEATED RBC: 0 K/CU MM
TOTAL PROTEIN: 5 GM/DL (ref 6.4–8.2)
WBC # BLD: 4 K/CU MM (ref 4–10.5)

## 2021-03-12 PROCEDURE — 85025 COMPLETE CBC W/AUTO DIFF WBC: CPT

## 2021-03-12 PROCEDURE — 82140 ASSAY OF AMMONIA: CPT

## 2021-03-12 PROCEDURE — 80053 COMPREHEN METABOLIC PANEL: CPT

## 2021-03-12 PROCEDURE — 36415 COLL VENOUS BLD VENIPUNCTURE: CPT

## 2021-03-16 ENCOUNTER — HOSPITAL ENCOUNTER (OUTPATIENT)
Age: 76
Setting detail: SPECIMEN
Discharge: HOME | End: 2021-03-16
Payer: COMMERCIAL

## 2021-03-16 LAB
ANION GAP SERPL CALCULATED.3IONS-SCNC: 10 MMOL/L (ref 4–16)
BUN BLDV-MCNC: 7 MG/DL (ref 6–23)
CALCIUM SERPL-MCNC: 8.6 MG/DL (ref 8.3–10.6)
CHLORIDE BLD-SCNC: 109 MMOL/L (ref 99–110)
CO2: 28 MMOL/L (ref 21–32)
CREAT SERPL-MCNC: 0.9 MG/DL (ref 0.6–1.1)
ESTIMATED AVERAGE GLUCOSE: 160 MG/DL
GFR AFRICAN AMERICAN: >60 ML/MIN/1.73M2
GFR NON-AFRICAN AMERICAN: >60 ML/MIN/1.73M2
GLUCOSE BLD-MCNC: 91 MG/DL (ref 70–99)
HBA1C MFR BLD: 7.2 % (ref 4.2–6.3)
POTASSIUM SERPL-SCNC: 3.2 MMOL/L (ref 3.5–5.1)
SODIUM BLD-SCNC: 147 MMOL/L (ref 135–145)

## 2021-03-16 PROCEDURE — 36415 COLL VENOUS BLD VENIPUNCTURE: CPT

## 2021-03-16 PROCEDURE — 80048 BASIC METABOLIC PNL TOTAL CA: CPT

## 2021-03-16 PROCEDURE — 83036 HEMOGLOBIN GLYCOSYLATED A1C: CPT

## 2021-05-12 ENCOUNTER — HOSPITAL ENCOUNTER (INPATIENT)
Age: 76
LOS: 5 days | Discharge: HOME | DRG: 304 | End: 2021-05-18
Attending: HOSPITALIST | Admitting: HOSPITALIST
Payer: COMMERCIAL

## 2021-05-12 DIAGNOSIS — N39.0 URINARY TRACT INFECTION WITHOUT HEMATURIA, SITE UNSPECIFIED: Primary | ICD-10-CM

## 2021-05-12 DIAGNOSIS — R00.0 TACHYCARDIA: ICD-10-CM

## 2021-05-12 DIAGNOSIS — R04.0 EPISTAXIS: ICD-10-CM

## 2021-05-12 PROCEDURE — 96360 HYDRATION IV INFUSION INIT: CPT

## 2021-05-12 PROCEDURE — 96361 HYDRATE IV INFUSION ADD-ON: CPT

## 2021-05-12 PROCEDURE — 30901 CONTROL OF NOSEBLEED: CPT

## 2021-05-12 PROCEDURE — 99285 EMERGENCY DEPT VISIT HI MDM: CPT

## 2021-05-12 PROCEDURE — 2Y41X5Z PACKING OF NASAL REGION USING PACKING MATERIAL: ICD-10-PCS | Performed by: INTERNAL MEDICINE

## 2021-05-13 ENCOUNTER — APPOINTMENT (OUTPATIENT)
Dept: GENERAL RADIOLOGY | Age: 76
DRG: 304 | End: 2021-05-13
Payer: COMMERCIAL

## 2021-05-13 PROBLEM — R00.0 TACHYCARDIA: Status: ACTIVE | Noted: 2021-05-13

## 2021-05-13 LAB
ABO/RH: NORMAL
ALBUMIN SERPL-MCNC: 3.5 GM/DL (ref 3.4–5)
ALP BLD-CCNC: 68 IU/L (ref 40–128)
ALT SERPL-CCNC: 6 U/L (ref 10–40)
ANION GAP SERPL CALCULATED.3IONS-SCNC: 8 MMOL/L (ref 4–16)
ANTIBODY SCREEN: NEGATIVE
AST SERPL-CCNC: 10 IU/L (ref 15–37)
BACTERIA: ABNORMAL /HPF
BASOPHILS ABSOLUTE: 0 K/CU MM
BASOPHILS RELATIVE PERCENT: 0.5 % (ref 0–1)
BILIRUB SERPL-MCNC: 0.5 MG/DL (ref 0–1)
BILIRUBIN URINE: NEGATIVE MG/DL
BLOOD, URINE: NEGATIVE
BUN BLDV-MCNC: 15 MG/DL (ref 6–23)
CALCIUM SERPL-MCNC: 8.7 MG/DL (ref 8.3–10.6)
CHLORIDE BLD-SCNC: 104 MMOL/L (ref 99–110)
CLARITY: CLEAR
CO2: 26 MMOL/L (ref 21–32)
COLOR: YELLOW
CREAT SERPL-MCNC: 0.6 MG/DL (ref 0.6–1.1)
DIFFERENTIAL TYPE: ABNORMAL
EKG ATRIAL RATE: 120 BPM
EKG DIAGNOSIS: NORMAL
EKG P AXIS: 21 DEGREES
EKG P-R INTERVAL: 144 MS
EKG Q-T INTERVAL: 320 MS
EKG QRS DURATION: 70 MS
EKG QTC CALCULATION (BAZETT): 452 MS
EKG R AXIS: -34 DEGREES
EKG T AXIS: 12 DEGREES
EKG VENTRICULAR RATE: 120 BPM
EOSINOPHILS ABSOLUTE: 0.1 K/CU MM
EOSINOPHILS RELATIVE PERCENT: 1.9 % (ref 0–3)
ESTIMATED AVERAGE GLUCOSE: 166 MG/DL
GFR AFRICAN AMERICAN: >60 ML/MIN/1.73M2
GFR NON-AFRICAN AMERICAN: >60 ML/MIN/1.73M2
GLUCOSE BLD-MCNC: 120 MG/DL (ref 70–99)
GLUCOSE BLD-MCNC: 127 MG/DL (ref 70–99)
GLUCOSE BLD-MCNC: 135 MG/DL (ref 70–99)
GLUCOSE BLD-MCNC: 215 MG/DL (ref 70–99)
GLUCOSE, URINE: 150 MG/DL
HBA1C MFR BLD: 7.4 % (ref 4.2–6.3)
HCT VFR BLD CALC: 41.8 % (ref 37–47)
HCT VFR BLD CALC: 44.6 % (ref 37–47)
HEMOGLOBIN: 13.9 GM/DL (ref 12.5–16)
HEMOGLOBIN: 14.8 GM/DL (ref 12.5–16)
IMMATURE NEUTROPHIL %: 0.3 % (ref 0–0.43)
KETONES, URINE: ABNORMAL MG/DL
LACTATE: 1.2 MMOL/L (ref 0.4–2)
LEUKOCYTE ESTERASE, URINE: NEGATIVE
LYMPHOCYTES ABSOLUTE: 1.7 K/CU MM
LYMPHOCYTES RELATIVE PERCENT: 23.8 % (ref 24–44)
MCH RBC QN AUTO: 31.5 PG (ref 27–31)
MCHC RBC AUTO-ENTMCNC: 33.2 % (ref 32–36)
MCV RBC AUTO: 94.9 FL (ref 78–100)
MONOCYTES ABSOLUTE: 0.7 K/CU MM
MONOCYTES RELATIVE PERCENT: 9.2 % (ref 0–4)
MUCUS: ABNORMAL HPF
NITRITE URINE, QUANTITATIVE: POSITIVE
NUCLEATED RBC %: 0 %
PDW BLD-RTO: 13.4 % (ref 11.7–14.9)
PH, URINE: 6 (ref 5–8)
PLATELET # BLD: 230 K/CU MM (ref 140–440)
PMV BLD AUTO: 11.1 FL (ref 7.5–11.1)
POTASSIUM SERPL-SCNC: 3.7 MMOL/L (ref 3.5–5.1)
PROTEIN UA: NEGATIVE MG/DL
RBC # BLD: 4.7 M/CU MM (ref 4.2–5.4)
RBC URINE: 1 /HPF (ref 0–6)
SEGMENTED NEUTROPHILS ABSOLUTE COUNT: 4.7 K/CU MM
SEGMENTED NEUTROPHILS RELATIVE PERCENT: 64.3 % (ref 36–66)
SODIUM BLD-SCNC: 138 MMOL/L (ref 135–145)
SPECIFIC GRAVITY UA: 1.01 (ref 1–1.03)
SQUAMOUS EPITHELIAL: 1 /HPF
TOTAL IMMATURE NEUTOROPHIL: 0.02 K/CU MM
TOTAL NUCLEATED RBC: 0 K/CU MM
TOTAL PROTEIN: 6.4 GM/DL (ref 6.4–8.2)
TRICHOMONAS: ABNORMAL /HPF
TROPONIN T: <0.01 NG/ML
UROBILINOGEN, URINE: NEGATIVE MG/DL (ref 0.2–1)
WBC # BLD: 7.3 K/CU MM (ref 4–10.5)
WBC UA: 1 /HPF (ref 0–5)

## 2021-05-13 PROCEDURE — 83036 HEMOGLOBIN GLYCOSYLATED A1C: CPT

## 2021-05-13 PROCEDURE — 83605 ASSAY OF LACTIC ACID: CPT

## 2021-05-13 PROCEDURE — 2580000003 HC RX 258: Performed by: INTERNAL MEDICINE

## 2021-05-13 PROCEDURE — 1200000000 HC SEMI PRIVATE

## 2021-05-13 PROCEDURE — 80053 COMPREHEN METABOLIC PANEL: CPT

## 2021-05-13 PROCEDURE — 6370000000 HC RX 637 (ALT 250 FOR IP): Performed by: HOSPITALIST

## 2021-05-13 PROCEDURE — 2580000003 HC RX 258: Performed by: PHYSICIAN ASSISTANT

## 2021-05-13 PROCEDURE — 84484 ASSAY OF TROPONIN QUANT: CPT

## 2021-05-13 PROCEDURE — 86901 BLOOD TYPING SEROLOGIC RH(D): CPT

## 2021-05-13 PROCEDURE — 87086 URINE CULTURE/COLONY COUNT: CPT

## 2021-05-13 PROCEDURE — 87040 BLOOD CULTURE FOR BACTERIA: CPT

## 2021-05-13 PROCEDURE — 71045 X-RAY EXAM CHEST 1 VIEW: CPT

## 2021-05-13 PROCEDURE — 87186 SC STD MICRODIL/AGAR DIL: CPT

## 2021-05-13 PROCEDURE — 2500000003 HC RX 250 WO HCPCS: Performed by: PHYSICIAN ASSISTANT

## 2021-05-13 PROCEDURE — 6370000000 HC RX 637 (ALT 250 FOR IP): Performed by: INTERNAL MEDICINE

## 2021-05-13 PROCEDURE — 93010 ELECTROCARDIOGRAM REPORT: CPT | Performed by: INTERNAL MEDICINE

## 2021-05-13 PROCEDURE — 85014 HEMATOCRIT: CPT

## 2021-05-13 PROCEDURE — 6360000002 HC RX W HCPCS: Performed by: PHYSICIAN ASSISTANT

## 2021-05-13 PROCEDURE — 85025 COMPLETE CBC W/AUTO DIFF WBC: CPT

## 2021-05-13 PROCEDURE — 96375 TX/PRO/DX INJ NEW DRUG ADDON: CPT

## 2021-05-13 PROCEDURE — 81001 URINALYSIS AUTO W/SCOPE: CPT

## 2021-05-13 PROCEDURE — 87088 URINE BACTERIA CULTURE: CPT

## 2021-05-13 PROCEDURE — 82962 GLUCOSE BLOOD TEST: CPT

## 2021-05-13 PROCEDURE — 96365 THER/PROPH/DIAG IV INF INIT: CPT

## 2021-05-13 PROCEDURE — 93005 ELECTROCARDIOGRAM TRACING: CPT | Performed by: PHYSICIAN ASSISTANT

## 2021-05-13 PROCEDURE — 86850 RBC ANTIBODY SCREEN: CPT

## 2021-05-13 PROCEDURE — 86900 BLOOD TYPING SEROLOGIC ABO: CPT

## 2021-05-13 PROCEDURE — 85018 HEMOGLOBIN: CPT

## 2021-05-13 RX ORDER — ACETAMINOPHEN 325 MG/1
650 TABLET ORAL EVERY 6 HOURS PRN
Status: DISCONTINUED | OUTPATIENT
Start: 2021-05-13 | End: 2021-05-18 | Stop reason: HOSPADM

## 2021-05-13 RX ORDER — LISINOPRIL 2.5 MG/1
2.5 TABLET ORAL DAILY
Status: DISCONTINUED | OUTPATIENT
Start: 2021-05-13 | End: 2021-05-18 | Stop reason: HOSPADM

## 2021-05-13 RX ORDER — ATORVASTATIN CALCIUM 10 MG/1
10 TABLET, FILM COATED ORAL DAILY
Status: DISCONTINUED | OUTPATIENT
Start: 2021-05-13 | End: 2021-05-18 | Stop reason: HOSPADM

## 2021-05-13 RX ORDER — DEXTROSE MONOHYDRATE 50 MG/ML
100 INJECTION, SOLUTION INTRAVENOUS PRN
Status: DISCONTINUED | OUTPATIENT
Start: 2021-05-13 | End: 2021-05-18 | Stop reason: HOSPADM

## 2021-05-13 RX ORDER — INSULIN GLARGINE 100 [IU]/ML
15 INJECTION, SOLUTION SUBCUTANEOUS NIGHTLY
Status: DISCONTINUED | OUTPATIENT
Start: 2021-05-13 | End: 2021-05-18 | Stop reason: HOSPADM

## 2021-05-13 RX ORDER — METHOCARBAMOL 500 MG/1
750 TABLET, FILM COATED ORAL 2 TIMES DAILY
Status: DISCONTINUED | OUTPATIENT
Start: 2021-05-13 | End: 2021-05-17

## 2021-05-13 RX ORDER — 0.9 % SODIUM CHLORIDE 0.9 %
1000 INTRAVENOUS SOLUTION INTRAVENOUS ONCE
Status: COMPLETED | OUTPATIENT
Start: 2021-05-13 | End: 2021-05-13

## 2021-05-13 RX ORDER — NICOTINE POLACRILEX 4 MG
15 LOZENGE BUCCAL PRN
Status: DISCONTINUED | OUTPATIENT
Start: 2021-05-13 | End: 2021-05-18 | Stop reason: HOSPADM

## 2021-05-13 RX ORDER — ONDANSETRON 2 MG/ML
4 INJECTION INTRAMUSCULAR; INTRAVENOUS EVERY 6 HOURS PRN
Status: DISCONTINUED | OUTPATIENT
Start: 2021-05-13 | End: 2021-05-18 | Stop reason: HOSPADM

## 2021-05-13 RX ORDER — ACETAMINOPHEN 650 MG/1
650 SUPPOSITORY RECTAL EVERY 6 HOURS PRN
Status: DISCONTINUED | OUTPATIENT
Start: 2021-05-13 | End: 2021-05-18 | Stop reason: HOSPADM

## 2021-05-13 RX ORDER — DEXTROSE MONOHYDRATE 25 G/50ML
12.5 INJECTION, SOLUTION INTRAVENOUS PRN
Status: DISCONTINUED | OUTPATIENT
Start: 2021-05-13 | End: 2021-05-18 | Stop reason: HOSPADM

## 2021-05-13 RX ORDER — SODIUM CHLORIDE 9 MG/ML
INJECTION, SOLUTION INTRAVENOUS CONTINUOUS
Status: DISCONTINUED | OUTPATIENT
Start: 2021-05-13 | End: 2021-05-13

## 2021-05-13 RX ORDER — CLONIDINE HYDROCHLORIDE 0.1 MG/1
0.1 TABLET ORAL ONCE
Status: DISCONTINUED | OUTPATIENT
Start: 2021-05-13 | End: 2021-05-13

## 2021-05-13 RX ORDER — CITALOPRAM 20 MG/1
20 TABLET ORAL DAILY
Status: DISCONTINUED | OUTPATIENT
Start: 2021-05-13 | End: 2021-05-18 | Stop reason: HOSPADM

## 2021-05-13 RX ORDER — ALOGLIPTIN 12.5 MG/1
12.5 TABLET, FILM COATED ORAL DAILY
Status: DISCONTINUED | OUTPATIENT
Start: 2021-05-13 | End: 2021-05-18 | Stop reason: HOSPADM

## 2021-05-13 RX ORDER — SODIUM CHLORIDE 0.9 % (FLUSH) 0.9 %
10 SYRINGE (ML) INJECTION PRN
Status: DISCONTINUED | OUTPATIENT
Start: 2021-05-13 | End: 2021-05-18 | Stop reason: HOSPADM

## 2021-05-13 RX ORDER — SODIUM CHLORIDE 0.9 % (FLUSH) 0.9 %
10 SYRINGE (ML) INJECTION EVERY 12 HOURS SCHEDULED
Status: DISCONTINUED | OUTPATIENT
Start: 2021-05-13 | End: 2021-05-18 | Stop reason: HOSPADM

## 2021-05-13 RX ORDER — METOPROLOL TARTRATE 5 MG/5ML
5 INJECTION INTRAVENOUS ONCE
Status: COMPLETED | OUTPATIENT
Start: 2021-05-13 | End: 2021-05-13

## 2021-05-13 RX ORDER — ASPIRIN AND DIPYRIDAMOLE 25; 200 MG/1; MG/1
1 CAPSULE, EXTENDED RELEASE ORAL 2 TIMES DAILY
Status: DISCONTINUED | OUTPATIENT
Start: 2021-05-13 | End: 2021-05-18 | Stop reason: HOSPADM

## 2021-05-13 RX ORDER — CEPHALEXIN 250 MG/1
500 CAPSULE ORAL EVERY 12 HOURS SCHEDULED
Status: DISCONTINUED | OUTPATIENT
Start: 2021-05-14 | End: 2021-05-14

## 2021-05-13 RX ORDER — HYDROCODONE BITARTRATE AND ACETAMINOPHEN 5; 325 MG/1; MG/1
1 TABLET ORAL EVERY 6 HOURS PRN
Status: DISCONTINUED | OUTPATIENT
Start: 2021-05-13 | End: 2021-05-18 | Stop reason: HOSPADM

## 2021-05-13 RX ORDER — CARVEDILOL 12.5 MG/1
12.5 TABLET ORAL 2 TIMES DAILY
Status: DISCONTINUED | OUTPATIENT
Start: 2021-05-13 | End: 2021-05-17

## 2021-05-13 RX ORDER — HYDROCODONE BITARTRATE AND ACETAMINOPHEN 5; 325 MG/1; MG/1
1 TABLET ORAL ONCE
Status: DISCONTINUED | OUTPATIENT
Start: 2021-05-13 | End: 2021-05-13

## 2021-05-13 RX ORDER — PROMETHAZINE HYDROCHLORIDE 25 MG/1
12.5 TABLET ORAL EVERY 6 HOURS PRN
Status: DISCONTINUED | OUTPATIENT
Start: 2021-05-13 | End: 2021-05-18 | Stop reason: HOSPADM

## 2021-05-13 RX ORDER — CARVEDILOL 12.5 MG/1
TABLET ORAL
Status: ON HOLD | COMMUNITY
Start: 2020-12-17 | End: 2021-05-18 | Stop reason: HOSPADM

## 2021-05-13 RX ORDER — SODIUM CHLORIDE 9 MG/ML
25 INJECTION, SOLUTION INTRAVENOUS PRN
Status: DISCONTINUED | OUTPATIENT
Start: 2021-05-13 | End: 2021-05-18 | Stop reason: HOSPADM

## 2021-05-13 RX ADMIN — CARVEDILOL 12.5 MG: 12.5 TABLET, FILM COATED ORAL at 12:25

## 2021-05-13 RX ADMIN — SODIUM CHLORIDE 1000 ML: 9 INJECTION, SOLUTION INTRAVENOUS at 07:13

## 2021-05-13 RX ADMIN — METHOCARBAMOL 750 MG: 500 TABLET ORAL at 20:16

## 2021-05-13 RX ADMIN — CARVEDILOL 12.5 MG: 12.5 TABLET, FILM COATED ORAL at 20:16

## 2021-05-13 RX ADMIN — METHOCARBAMOL 750 MG: 500 TABLET ORAL at 12:25

## 2021-05-13 RX ADMIN — INSULIN GLARGINE 15 UNITS: 100 INJECTION, SOLUTION SUBCUTANEOUS at 20:23

## 2021-05-13 RX ADMIN — SODIUM CHLORIDE, PRESERVATIVE FREE 10 ML: 5 INJECTION INTRAVENOUS at 20:16

## 2021-05-13 RX ADMIN — CEFTRIAXONE SODIUM 1000 MG: 1 INJECTION, POWDER, FOR SOLUTION INTRAMUSCULAR; INTRAVENOUS at 07:12

## 2021-05-13 RX ADMIN — SODIUM CHLORIDE, PRESERVATIVE FREE 10 ML: 5 INJECTION INTRAVENOUS at 10:08

## 2021-05-13 RX ADMIN — CITALOPRAM HYDROBROMIDE 20 MG: 20 TABLET ORAL at 10:07

## 2021-05-13 RX ADMIN — METOPROLOL TARTRATE 5 MG: 1 INJECTION, SOLUTION INTRAVENOUS at 21:19

## 2021-05-13 RX ADMIN — ATORVASTATIN CALCIUM 10 MG: 10 TABLET, FILM COATED ORAL at 10:08

## 2021-05-13 RX ADMIN — LISINOPRIL 2.5 MG: 2.5 TABLET ORAL at 10:07

## 2021-05-13 RX ADMIN — SODIUM CHLORIDE: 9 INJECTION, SOLUTION INTRAVENOUS at 02:09

## 2021-05-13 RX ADMIN — ALOGLIPTIN 12.5 MG: 12.5 TABLET, FILM COATED ORAL at 10:07

## 2021-05-13 RX ADMIN — ACETAMINOPHEN 650 MG: 325 TABLET ORAL at 20:17

## 2021-05-13 ASSESSMENT — PAIN SCALES - GENERAL: PAINLEVEL_OUTOF10: 0

## 2021-05-13 NOTE — ED NOTES
Bed: 02TR-02  Expected date:   Expected time:   Means of arrival:   Comments:  [facility]  05/12/21 1538

## 2021-05-13 NOTE — ED PROVIDER NOTES
7:40 AM EDT  Bean Huitron was checked out to me by [facility], PAULETTE. Please see his/her initial documentation for details of the patient's ED presentation, physical exam and completed studies. In brief, Bean Huitron presented for epistaxis from the left nares. This was controlled with left nasal packing by PA [place]. She is on Aggrenox. She continued to be tachycardic in ED and urinalysis was pending. Patient denies any complaints aside from discomfort in her left nose. She appears mildly demented. Exam: Patient asleep in bed. No distress. Significant thoracic kyphosis noted. Packing noted in right nares. No bleeding noted from exterior nose, oropharyngeal exam reveals no blood in the oropharynx. Patient has moist mucous membranes. Heart is tachycardic at 125. Lungs clear to auscultation.     I have reviewed and interpreted all of the currently available lab results from this visit (if applicable):  Results for orders placed or performed during the hospital encounter of 05/12/21   CBC auto diff   Result Value Ref Range    WBC 7.3 4.0 - 10.5 K/CU MM    RBC 4.70 4.2 - 5.4 M/CU MM    Hemoglobin 14.8 12.5 - 16.0 GM/DL    Hematocrit 44.6 37 - 47 %    MCV 94.9 78 - 100 FL    MCH 31.5 (H) 27 - 31 PG    MCHC 33.2 32.0 - 36.0 %    RDW 13.4 11.7 - 14.9 %    Platelets 685 493 - 913 K/CU MM    MPV 11.1 7.5 - 11.1 FL    Differential Type AUTOMATED DIFFERENTIAL     Segs Relative 64.3 36 - 66 %    Lymphocytes % 23.8 (L) 24 - 44 %    Monocytes % 9.2 (H) 0 - 4 %    Eosinophils % 1.9 0 - 3 %    Basophils % 0.5 0 - 1 %    Segs Absolute 4.7 K/CU MM    Lymphocytes Absolute 1.7 K/CU MM    Monocytes Absolute 0.7 K/CU MM    Eosinophils Absolute 0.1 K/CU MM    Basophils Absolute 0.0 K/CU MM    Nucleated RBC % 0.0 %    Total Nucleated RBC 0.0 K/CU MM    Total Immature Neutrophil 0.02 K/CU MM    Immature Neutrophil % 0.3 0 - 0.43 %   CMP   Result Value Ref Range    Sodium 138 135 - 145 MMOL/L    Potassium 3.7 3.5 - 5.1 MMOL/L    Chloride 104 99 - 110 mMol/L    CO2 26 21 - 32 MMOL/L    BUN 15 6 - 23 MG/DL    CREATININE 0.6 0.6 - 1.1 MG/DL    Glucose 215 (H) 70 - 99 MG/DL    Calcium 8.7 8.3 - 10.6 MG/DL    Albumin 3.5 3.4 - 5.0 GM/DL    Total Protein 6.4 6.4 - 8.2 GM/DL    Total Bilirubin 0.5 0.0 - 1.0 MG/DL    ALT 6 (L) 10 - 40 U/L    AST 10 (L) 15 - 37 IU/L    Alkaline Phosphatase 68 40 - 128 IU/L    GFR Non-African American >60 >60 mL/min/1.73m2    GFR African American >60 >60 mL/min/1.73m2    Anion Gap 8 4 - 16   Troponin   Result Value Ref Range    Troponin T <0.010 <0.01 NG/ML   Lactic Acid, Plasma   Result Value Ref Range    Lactate 1.2 0.4 - 2.0 mMOL/L   Urinalysis   Result Value Ref Range    Color, UA YELLOW YELLOW    Clarity, UA CLEAR CLEAR    Glucose, Urine 150 (A) NEGATIVE MG/DL    Bilirubin Urine NEGATIVE NEGATIVE MG/DL    Ketones, Urine MODERATE (A) NEGATIVE MG/DL    Specific Gravity, UA 1.015 1.001 - 1.035    Blood, Urine NEGATIVE NEGATIVE    pH, Urine 6.0 5.0 - 8.0    Protein, UA NEGATIVE NEGATIVE MG/DL    Urobilinogen, Urine NEGATIVE 0.2 - 1.0 MG/DL    Nitrite Urine, Quantitative POSITIVE (A) NEGATIVE    Leukocyte Esterase, Urine NEGATIVE NEGATIVE    RBC, UA 1 0 - 6 /HPF    WBC, UA 1 0 - 5 /HPF    Bacteria, UA MODERATE (A) NEGATIVE /HPF    Squam Epithel, UA 1 /HPF    Mucus, UA RARE (A) NEGATIVE HPF    Trichomonas, UA NONE SEEN NONE SEEN /HPF   Hemoglobin and hematocrit, blood   Result Value Ref Range    Hemoglobin 13.9 12.5 - 16.0 GM/DL    Hematocrit 41.8 37 - 47 %   EKG 12 Lead   Result Value Ref Range    Ventricular Rate 120 BPM    Atrial Rate 120 BPM    P-R Interval 144 ms    QRS Duration 70 ms    Q-T Interval 320 ms    QTc Calculation (Bazett) 452 ms    P Axis 21 degrees    R Axis -34 degrees    T Axis 12 degrees    Diagnosis       Sinus tachycardia  Left axis deviation  Inferior infarct (cited on or before 28-MAY-2018)  Anterolateral infarct (cited on or before 28-MAY-2018)  Abnormal ECG  When

## 2021-05-13 NOTE — ED PROVIDER NOTES
Triage Chief Complaint:   Epistaxis (on plavix. nose clamp placed upon arrival)    [place]:  Today in the ED I had the pleasure of caring for Josias Lewis who is a 77 y.o. female that presents to the ED for epistaxis. Onset just prior to arrival.  Patient does have a history of CVA. She is on Plavix. No known trauma. Onset just prior to arrival patient has had nosebleed in the past but never this bad. Brought here by EMS. Patient denies any lightheadedness or dizziness. Denies any faintness. No abdominal pain. No chest pain. No nasal pain. ROS:  REVIEW OF SYSTEMS    At least 10 systems reviewed      All other review of systems are negative  See HPI and nursing notes for additional information       Past Medical History:   Diagnosis Date    CVA (cerebral infarction) 3885,1917    Diabetes mellitus ([facility] [address]     History of cardiovascular stress test 5/29/14    EF 70% No ischemia. SV function normal. Normal study.  Hx of echocardiogram 6/9/2014    normal, EF55%    Hyperlipidemia     Unspecified cerebral artery occlusion with cerebral infarction      History reviewed. No pertinent surgical history. Family History   Problem Relation Age of Onset    Diabetes Other         Parent    High Blood Pressure Other         Parent     Social History     Socioeconomic History    Marital status:       Spouse name: Not on file    Number of children: Not on file    Years of education: Not on file    Highest education level: Not on file   Occupational History    Not on file   Social Needs    Financial resource strain: Not on file    Food insecurity     Worry: Not on file     Inability: Not on file    Transportation needs     Medical: Not on file     Non-medical: Not on file   Tobacco Use    Smoking status: Never Smoker    Smokeless tobacco: Never Used   Substance and Sexual Activity    Alcohol use: No    Drug use: No    Sexual activity: Not on file   Lifestyle    Physical activity     Days per week: Not on file     Minutes per session: Not on file    Stress: Not on file   Relationships    Social connections     Talks on phone: Not on file     Gets together: Not on file     Attends [employer] service: Not on file     Active member of club or organization: Not on file     Attends meetings of clubs or organizations: Not on file     Relationship status: Not on file    Intimate partner violence     Fear of current or ex partner: Not on file     Emotionally abused: Not on file     Physically abused: Not on file     Forced sexual activity: Not on file   Other Topics Concern    Not on file   Social History Narrative    Not on file     No current facility-administered medications for this encounter. Current Outpatient Medications   Medication Sig Dispense Refill    insulin glargine (LANTUS) 100 UNIT/ML injection vial Inject 30 Units into the skin nightly 1 vial 3    linagliptin (TRADJENTA) 5 MG tablet Take 1 tablet by mouth daily 30 tablet 3    HYDROcodone-acetaminophen (NORCO) 5-325 MG per tablet Take 1 tablet by mouth every 6 hours as needed for Pain 10 tablet 0    LISINOPRIL PO Take by mouth      methocarbamol (ROBAXIN-750) 750 MG TABS Take 1 tablet by mouth 2 times daily 14 tablet 0    metFORMIN (GLUCOPHAGE) 500 MG tablet Take 500 mg by mouth 2 times daily (with meals).  citalopram (CELEXA) 20 MG tablet Take 20 mg by mouth daily.  simvastatin (ZOCOR) 10 MG tablet Take 10 mg by mouth nightly.  Multiple Vitamins-Minerals (THERAPEUTIC MULTIVITAMIN-MINERALS) tablet Take 1 tablet by mouth daily.  calcium carbonate (OSCAL) 500 MG TABS tablet Take 400 mg by mouth daily.  dipyridamole-aspirin (AGGRENOX)  MG per SR capsule Take 1 capsule by mouth 2 times daily.        No Known Allergies    Nursing Notes Reviewed    Physical Exam:  ED Triage Vitals [05/12/21 4470]   Enc Vitals Group      /71      Pulse 101      Resp 17      Temp 97.8 °F (36.6 °C)      Temp Source Axillary      SpO2 98 %      Weight       Height       Head Circumference       Peak Flow       Pain Score       Pain Loc       Pain Edu? Excl. in [address]? General :Patient is awake alert oriented person place and time no acute distress nontoxic appearing early female. HEENT: Pupils are equally round and reactive to light extraocular motors are intact conjunctivae clear sclerae white there is no injection no icterus. Nose with significant epistaxis out of L nares without septal hematoma  Oral mucosa is moist no exudate buccal mucosa shows no ulcerations. Uvula is midline. Active bleeding noted from the left nares. Dark red blood. Neck: Neck is supple full range of motion trachea midline thyroid nonpalpable  Cardiac: Heart regular rate rhythm no murmurs rubs clicks or gallops  Lungs: Lungs are clear to auscultation there is no wheezing rhonchi or rales. There is no use of accessory muscles no nasal flaring identified. Chest wall: There is no tenderness to palpation over the chest wall or over ribs  Abdomen: Abdomen is soft nontender nondistended. There is no firm or pulsatile masses no rebound rigidity or guarding negative Presley's negative McBurney, no peritoneal signs  Suprapubic:  there is no tenderness to palpation over the external bladder   Musculoskeletal: 5 out of 5 strength in all 4 extremities full flexion extension abduction and adduction supination pronation of all extremities and all digits. No obvious muscle atrophy is noted. No focal muscle deficits are appreciated  Dermatology: Skin is warm and dry there is no obvious abscesses lacerations or lesions noted  Psych: Mentation is grossly normal cognition is grossly normal. Affect is appropriate    Procedure note- nasal packing. Risk benefits of procedure discussed with patient. Patient did give verbal consent. NAres was evacuated using blowing and suction. No FB retained.  Lubricated,  Rhinorocket was then introduced into L nares with little resistance. 5cc of air then used to fill packing. Adequate hemostasis achieved. No posterior pharynx bleeding or drip noted. Patient tolerated procedure well. No complications     I have reviewed and interpreted all of the currently available lab results from this visit (if applicable):  No results found for this visit on 05/12/21. Radiographs (if obtained):  [] The following radiograph was interpreted by myself in the absence of a radiologist:   [] Radiologist's Report Reviewed:  No orders to display       EKG (if obtained):   Please See Note of attending physician for EKG interpretation. Chart review shows recent radiograph(s):  No results found. MDM:     Interventions given this visit: No orders of the defined types were placed in this encounter. Patient presents today with bleeding from the nose. Packing placed and hemostasis achielved. During patients stay, she did become tachycardic. Reevaluation reveals no continue bleeding.     0603  I have signed out [facility] Emergency Department care to CELINA Pryor We discussed the pertinent history, physical exam, completed/pending test results (if applicable) and current treatment plan. Please refer to his/her chart for the patients remaining Emergency Department course and final disposition. I independently managed patient today in the ED    /71   Pulse 101   Temp 97.8 °F (36.6 °C) (Axillary)   Resp 17   SpO2 98%       Clinical Impression:  1. Urinary tract infection without hematuria, site unspecified    2. Tachycardia    3. Epistaxis        Disposition referral (if applicable):  No follow-up provider specified.   Disposition medications (if applicable):  New Prescriptions    No medications on file         Comment: Please note this report has been produced using speech recognition software and may contain errors related to that system including errors in grammar, punctuation, and spelling, as well as words and phrases that may be inappropriate. If there are any questions or concerns please feel free to contact the dictating provider for clarification.       Halle Calhoun, [address], [place]  05/23/21 5674

## 2021-05-13 NOTE — ED NOTES
Pt removed nose clamp again. Informed pt that she needs to keep clamp on. Pt states that she doesn't like it. Reassured pt that she needs to keep nose clamp in place to help prevent nose from continuing to bleed.      Candace Mcdonald RN  05/12/21 4767

## 2021-05-13 NOTE — ED NOTES
Report given to Rock Adolfo RN. Pt care transferred at this time.       Damon Olea RN  05/13/21 5830

## 2021-05-13 NOTE — H&P
History and Physical      Name:  Paola Palencia /Age/Sex: 1945  (77 y.o. female)   MRN & CSN:  [id number] & [id number] Admission Date/Time: 2021 10:46 PM   Location:   PCP: Pieter Schirmer To, MD       Hospital Day: 2    Assessment and Plan:   Paola Palencia is a 77 y.o.  female  who presents with Nasal Bleed, found to be in sinus tachycardia and elevated BP/    >Epistaxis  - admit, nasal packing, keflex x 5 days, holding aggrenox, monitor H&H  - D/W daughter would need FU with ENT in 3-5 days    >Sinus tachycardia   - most likely sec to stress sec to above and being due for her home coreg  - restart coreg, monitor HR    >Essential HTN  - resume home meds, monitor BP    >No Pyuria, no UTI    >DM type 2    - hold metformin, cover with lantus, SSI    >H/o CVA- at baseline ( no focal deficit )   - Hold aggrenox due to nasal bleed    >Dyslipidemia  - continue lipitor    >Cervical dystonia- at baseline  >Reported history of dementia per daughter  >Full code per daughter Eugenia Willis ( [place] )       Diet No diet orders on file   DVT Prophylaxis [] SCDs   Code Status Prior   Disposition  back to SNF, anticipate tomorrow, pending HGB, HR and BP stability     History of Present Illness:     Chief Complaint: Nasal Bleed, found to be in sinus tachycardia and elevated BP/  Paola Palencia is a 77 y.o.  female  who presents with Nasal Bleed, found to be in sinus tachycardia and elevated BP/     Pt [facility] term NH resident brought to ED due to uncontrolled nasal bleed, pt on aggrenox for h/o CVA, had nasal bleed episodes before but this one was worse than her usual. No chest pain, no dyspnea, no abd pain, no N/V, no F/C. While in ED found to have uncontrolled HTN and sinus tachycardia. Talked to pt's Daughter Eugenia Willis [facility] ) discussed dx and plan of care .      10-14 point ROS reviewed negative, unless as noted above     Objective:   No intake or output data in the 24 hours ending 21 0726   Vitals:   Vitals: 05/13/21 0701   BP: (!) 156/94   Pulse: 118   Resp: 22   Temp:    SpO2: 96%     Physical Exam:    GEN Awake female, sitting upright in bed in no apparent distress. Appears given age. Obese  EYES Pupils are equally round. No scleral erythema, discharge, or conjunctivitis. HENT Mucous membranes are moist. Nasal packing, clotted blood. No active bleeding  NECK No apparent thyromegaly or masses. Cervical dystonia  RESP Clear to auscultation, no wheezes, rales or rhonchi. Symmetric chest movement . CARDIO/VASC S1/S2 auscultated. Regular rate . No peripheral edema. GI Abdomen is soft without significant tenderness, or guarding. Bowel sounds are normoactive. Rectal exam deferred.  Garibay catheter is not present. MSK Cervical dystonia, Kyphosis. Spontaneous movement of all extremities  SKIN Normal coloration, warm, dry. NEURO Cranial nerves appear grossly intact, normal speech, no lateralizing weakness. PSYCH Awake, alert, oriented x 4. Affect appropriate. Past Medical History:      Past Medical History:   Diagnosis Date    CVA (cerebral infarction) 5404,6275    Diabetes mellitus ([facility] [address]     History of cardiovascular stress test 5/29/14    EF 70% No ischemia. SV function normal. Normal study.  Hx of echocardiogram 6/9/2014    normal, EF55%    Hyperlipidemia     Unspecified cerebral artery occlusion with cerebral infarction      PSHX:  has no past surgical history on file. Allergies: No Known Allergies    FAM HX: family history includes Diabetes in an other family member; High Blood Pressure in an other family member. Soc HX:   Social History     Socioeconomic History    Marital status:       Spouse name: None    Number of children: None    Years of education: None    Highest education level: None   Occupational History    None   Social Needs    Financial resource strain: None    Food insecurity     Worry: None     Inability: None    Transportation needs     Medical: None Non-medical: None   Tobacco Use    Smoking status: Never Smoker    Smokeless tobacco: Never Used   Substance and Sexual Activity    Alcohol use: No    Drug use: No    Sexual activity: None   Lifestyle    Physical activity     Days per week: None     Minutes per session: None    Stress: None   Relationships    Social connections     Talks on phone: None     Gets together: None     Attends Mu-ism service: None     Active member of club or organization: None     Attends meetings of clubs or organizations: None     Relationship status: None    Intimate partner violence     Fear of current or ex partner: None     Emotionally abused: None     Physically abused: None     Forced sexual activity: None   Other Topics Concern    None   Social History Narrative    None       Medications:   Medications:   Prior to Admission medications    Medication Sig Start Date End Date Taking? Authorizing Provider   insulin glargine (LANTUS) 100 UNIT/ML injection vial Inject 30 Units into the skin nightly 5/30/18   Eduardo Garcia MD   linagliptin (TRADJENTA) 5 MG tablet Take 1 tablet by mouth daily 5/30/18   Eduardo Garcia MD   HYDROcodone-acetaminophen [facility]) 5-325 MG per tablet Take 1 tablet by mouth every 6 hours as needed for Pain 7/31/15   Daryl Barrios MD   LISINOPRIL PO Take by mouth    Historical Provider, MD   methocarbamol (ROBAXIN-750) 750 MG TABS Take 1 tablet by mouth 2 times daily 7/15/15   Alison Ortega DO   metFORMIN (GLUCOPHAGE) 500 MG tablet Take 500 mg by mouth 2 times daily (with meals). Historical Provider, MD   citalopram (CELEXA) 20 MG tablet Take 20 mg by mouth daily. Historical Provider, MD   simvastatin (ZOCOR) 10 MG tablet Take 10 mg by mouth nightly. Historical Provider, MD   Multiple Vitamins-Minerals (THERAPEUTIC MULTIVITAMIN-MINERALS) tablet Take 1 tablet by mouth daily. Historical Provider, MD   calcium carbonate (OSCAL) 500 MG TABS tablet Take 400 mg by mouth daily. Historical Provider, MD   dipyridamole-aspirin (AGGRENOX)  MG per SR capsule Take 1 capsule by mouth 2 times daily.     Historical Provider, MD      Infusions:    sodium chloride 100 mL/hr at 05/13/21 0209     PRN Meds:        Electronically signed by Sara Wilson MD on 5/13/2021 at 7:26 AM

## 2021-05-13 NOTE — ED NOTES
Pt changed into hospital gown and depends and bedding completely changed at this time. Urine collected and sent to lab.       Abigail Dunn RN  05/13/21 8335

## 2021-05-13 NOTE — ED NOTES
Daughter pancho to leave bedside. PT placed on bed alarm and wearing non-slip socks. Call light in reach.       Ajit Kinney RN  05/13/21 275 Gautam Santamaria RN  05/13/21 4469

## 2021-05-13 NOTE — DISCHARGE INSTR - COC
Resp 23   Ht 4' 11\" (1.499 m)   Wt 166 lb (75.3 kg)   SpO2 97%   BMI 33.53 kg/m²     Last documented pain score (0-10 scale):    Last Weight:   Wt Readings from Last 1 Encounters:   05/13/21 166 lb (75.3 kg)     Mental Status:  alert    IV Access:  - None    Nursing Mobility/ADLs:  Walking   Dependent  Transfer  Dependent  Bathing  Dependent  Dressing  Dependent  Toileting  Dependent  Feeding  Dependent  Med Admin  Dependent  Med Delivery   crushed and prefers mixed with applesauce    Wound Care Documentation and Therapy:        Elimination:  Continence:   · Bowel: No  · Bladder: No  Urinary Catheter: None   Colostomy/Ileostomy/Ileal Conduit: No       Date of Last BM:   No intake or output data in the 24 hours ending 05/13/21 1007  No intake/output data recorded. Safety Concerns: At Risk for Falls    Impairments/Disabilities:      Contractures - neck    Patient's personal belongings (please select all that are sent with patient):  None    RN SIGNATURE:  Electronically signed by Cong Gagnon RN on 5/18/21 at 10:48 AM EDT                  PHYSICIAN SECTION    Nutrition Therapy:  Current Nutrition Therapy:   DIET GENERAL; Carb Control: 4 carb choices (60 gms)/meal; Dysphagia Minced and Moist     Routes of Feeding: Oral  Liquids: No Restrictions  Daily Fluid Restriction: no  Last Modified Barium Swallow with Video (Video Swallowing Test): not done    Treatments at the Time of Hospital Discharge:   Respiratory Treatments: None  Oxygen Therapy:  is not on home oxygen therapy. Ventilator:    - No ventilator support    Rehab Therapies: Physical Therapy and Occupational Therapy  Weight Bearing Status/Restrictions: No weight bearing restirctions  Other Medical Equipment (for information only, NOT a DME order):  Per PT/OT  Other Treatments:Hold Aggranox for 2 days and restart from 5/16/2021. If still bleeding from nose can switch to Plavix.   Please follow up with ENT in 3-4 days    Prognosis: Fair    Condition at Discharge: Stable    Rehab Potential (if transferring to Rehab): Fair    Recommended Labs or Other Treatments After Discharge: Jimmey Hails for 2 days and restart from 5/16/2021. If still bleeding from nose can switch to Plavix. Please follow up with ENT in 3-4 days  Please check CBC in 3 days  Keflex 500 mg twice daily for 5 days    Physician Certification: I certify the above information and transfer of Bean Huitron  is necessary for the continuing treatment of the diagnosis listed and that she requires Wilberto Kunzuel for GREATER/LESS 30 days(per rehab).      Update Admission H&P: Changes in H&P as follows - Please see last progress note/DC migdalia    PHYSICIAN SIGNATURE:  Electronically signed by Lilo Vickers MD on 5/14/21 at 10:38 AM EDT

## 2021-05-13 NOTE — CARE COORDINATION
Spoke with Dr. Adam Olea at desk regarding pt. He inquired about her living arrangements though she just admitted and admission is not completed yet. Noted her PCP is Dr. Kiah Fan. Elana Palencia with [facility] at [facility] who confirmed she is a long term care resident at [facility] and is on a bed hold hence is able to return once medically stable. CM following. 11:11 AM  Spoke with pt daughter/Valerie at [facility]. She inquired if physician is able to provide her with an update. PS to Dr. Adam Olea to update. Jose Gonzalez confirmed plan for pt to return to [place] once medically stable. CM following. Pt returning to [facility] at discharge. Please call report to [phone] and fax orders, AVS, and discharge summaries to [phone].

## 2021-05-14 ENCOUNTER — APPOINTMENT (OUTPATIENT)
Dept: CT IMAGING | Age: 76
DRG: 304 | End: 2021-05-14
Payer: COMMERCIAL

## 2021-05-14 LAB
ALBUMIN SERPL-MCNC: 3.4 GM/DL (ref 3.4–5)
ALP BLD-CCNC: 66 IU/L (ref 40–128)
ALT SERPL-CCNC: 6 U/L (ref 10–40)
AMMONIA: 40 UMOL/L (ref 11–51)
ANION GAP SERPL CALCULATED.3IONS-SCNC: 12 MMOL/L (ref 4–16)
AST SERPL-CCNC: 16 IU/L (ref 15–37)
BASE EXCESS MIXED: 2.7 (ref 0–2.3)
BASOPHILS ABSOLUTE: 0 K/CU MM
BASOPHILS RELATIVE PERCENT: 0.5 % (ref 0–1)
BILIRUB SERPL-MCNC: 0.5 MG/DL (ref 0–1)
BUN BLDV-MCNC: 8 MG/DL (ref 6–23)
CALCIUM SERPL-MCNC: 8.4 MG/DL (ref 8.3–10.6)
CARBON MONOXIDE, BLOOD: 2.4 % (ref 0–5)
CHLORIDE BLD-SCNC: 108 MMOL/L (ref 99–110)
CO2 CONTENT: 27.3 MMOL/L (ref 19–24)
CO2: 20 MMOL/L (ref 21–32)
COMMENT: ABNORMAL
CREAT SERPL-MCNC: 0.6 MG/DL (ref 0.6–1.1)
DIFFERENTIAL TYPE: ABNORMAL
EOSINOPHILS ABSOLUTE: 0.1 K/CU MM
EOSINOPHILS RELATIVE PERCENT: 0.6 % (ref 0–3)
GFR AFRICAN AMERICAN: >60 ML/MIN/1.73M2
GFR NON-AFRICAN AMERICAN: >60 ML/MIN/1.73M2
GLUCOSE BLD-MCNC: 105 MG/DL (ref 70–99)
GLUCOSE BLD-MCNC: 114 MG/DL (ref 70–99)
GLUCOSE BLD-MCNC: 118 MG/DL (ref 70–99)
GLUCOSE BLD-MCNC: 119 MG/DL (ref 70–99)
GLUCOSE BLD-MCNC: 134 MG/DL (ref 70–99)
HCO3 ARTERIAL: 26.2 MMOL/L (ref 18–23)
HCT VFR BLD CALC: 44.2 % (ref 37–47)
HEMOGLOBIN: 13.7 GM/DL (ref 12.5–16)
IMMATURE NEUTROPHIL %: 0.3 % (ref 0–0.43)
LYMPHOCYTES ABSOLUTE: 1.9 K/CU MM
LYMPHOCYTES RELATIVE PERCENT: 24.4 % (ref 24–44)
MCH RBC QN AUTO: 30.6 PG (ref 27–31)
MCHC RBC AUTO-ENTMCNC: 31 % (ref 32–36)
MCV RBC AUTO: 98.7 FL (ref 78–100)
METHEMOGLOBIN ARTERIAL: 1.3 %
MONOCYTES ABSOLUTE: 0.9 K/CU MM
MONOCYTES RELATIVE PERCENT: 11.8 % (ref 0–4)
NUCLEATED RBC %: 0 %
O2 SATURATION: 93.1 % (ref 96–97)
PCO2 ARTERIAL: 36 MMHG (ref 32–45)
PDW BLD-RTO: 13.6 % (ref 11.7–14.9)
PH BLOOD: 7.47 (ref 7.34–7.45)
PLATELET # BLD: 185 K/CU MM (ref 140–440)
PMV BLD AUTO: 10.8 FL (ref 7.5–11.1)
PO2 ARTERIAL: 64 MMHG (ref 75–100)
POTASSIUM SERPL-SCNC: 3.8 MMOL/L (ref 3.5–5.1)
PROCALCITONIN: 0.06
RBC # BLD: 4.48 M/CU MM (ref 4.2–5.4)
SEGMENTED NEUTROPHILS ABSOLUTE COUNT: 4.8 K/CU MM
SEGMENTED NEUTROPHILS RELATIVE PERCENT: 62.4 % (ref 36–66)
SODIUM BLD-SCNC: 140 MMOL/L (ref 135–145)
TOTAL IMMATURE NEUTOROPHIL: 0.02 K/CU MM
TOTAL NUCLEATED RBC: 0 K/CU MM
TOTAL PROTEIN: 5.5 GM/DL (ref 6.4–8.2)
WBC # BLD: 7.7 K/CU MM (ref 4–10.5)

## 2021-05-14 PROCEDURE — 82803 BLOOD GASES ANY COMBINATION: CPT

## 2021-05-14 PROCEDURE — 80053 COMPREHEN METABOLIC PANEL: CPT

## 2021-05-14 PROCEDURE — 36415 COLL VENOUS BLD VENIPUNCTURE: CPT

## 2021-05-14 PROCEDURE — 96366 THER/PROPH/DIAG IV INF ADDON: CPT

## 2021-05-14 PROCEDURE — 85025 COMPLETE CBC W/AUTO DIFF WBC: CPT

## 2021-05-14 PROCEDURE — 94761 N-INVAS EAR/PLS OXIMETRY MLT: CPT

## 2021-05-14 PROCEDURE — 70450 CT HEAD/BRAIN W/O DYE: CPT

## 2021-05-14 PROCEDURE — 6370000000 HC RX 637 (ALT 250 FOR IP): Performed by: HOSPITALIST

## 2021-05-14 PROCEDURE — 2580000003 HC RX 258: Performed by: INTERNAL MEDICINE

## 2021-05-14 PROCEDURE — 6370000000 HC RX 637 (ALT 250 FOR IP): Performed by: INTERNAL MEDICINE

## 2021-05-14 PROCEDURE — 84145 PROCALCITONIN (PCT): CPT

## 2021-05-14 PROCEDURE — 1200000000 HC SEMI PRIVATE

## 2021-05-14 PROCEDURE — 99223 1ST HOSP IP/OBS HIGH 75: CPT | Performed by: NURSE PRACTITIONER

## 2021-05-14 PROCEDURE — 82962 GLUCOSE BLOOD TEST: CPT

## 2021-05-14 PROCEDURE — 36600 WITHDRAWAL OF ARTERIAL BLOOD: CPT

## 2021-05-14 PROCEDURE — 82140 ASSAY OF AMMONIA: CPT

## 2021-05-14 PROCEDURE — 6360000002 HC RX W HCPCS: Performed by: INTERNAL MEDICINE

## 2021-05-14 RX ORDER — CEPHALEXIN 500 MG/1
500 CAPSULE ORAL EVERY 12 HOURS SCHEDULED
Qty: 10 CAPSULE | Refills: 0 | Status: SHIPPED | OUTPATIENT
Start: 2021-05-14 | End: 2021-05-18 | Stop reason: HOSPADM

## 2021-05-14 RX ORDER — SODIUM CHLORIDE, SODIUM LACTATE, POTASSIUM CHLORIDE, CALCIUM CHLORIDE 600; 310; 30; 20 MG/100ML; MG/100ML; MG/100ML; MG/100ML
INJECTION, SOLUTION INTRAVENOUS CONTINUOUS
Status: DISCONTINUED | OUTPATIENT
Start: 2021-05-14 | End: 2021-05-17

## 2021-05-14 RX ADMIN — INSULIN GLARGINE 15 UNITS: 100 INJECTION, SOLUTION SUBCUTANEOUS at 22:30

## 2021-05-14 RX ADMIN — CARVEDILOL 12.5 MG: 12.5 TABLET, FILM COATED ORAL at 13:23

## 2021-05-14 RX ADMIN — SODIUM CHLORIDE, POTASSIUM CHLORIDE, SODIUM LACTATE AND CALCIUM CHLORIDE: 600; 310; 30; 20 INJECTION, SOLUTION INTRAVENOUS at 16:54

## 2021-05-14 RX ADMIN — CEFTRIAXONE SODIUM 1000 MG: 1 INJECTION, POWDER, FOR SOLUTION INTRAMUSCULAR; INTRAVENOUS at 11:20

## 2021-05-14 RX ADMIN — SODIUM CHLORIDE, PRESERVATIVE FREE 10 ML: 5 INJECTION INTRAVENOUS at 10:51

## 2021-05-14 ASSESSMENT — PAIN SCALES - GENERAL
PAINLEVEL_OUTOF10: 0
PAINLEVEL_OUTOF10: 0

## 2021-05-14 NOTE — PROGRESS NOTES
Patient much more alert and is able to open her eyes to her name and state her name and birth date. Patient is disoriented otherwise. She is still lethargic, but is able to complete neuro assessment at this time.

## 2021-05-14 NOTE — DISCHARGE INSTR - DIET

## 2021-05-14 NOTE — PROGRESS NOTES
Hospitalist Progress Note      Name:  Isidro Hatchet /Age/Sex: 1945  (77 y.o. female)   MRN & CSN:  [id number] & [id number] Admission Date/Time: 2021 10:46 PM   Location:  8420/8268-Z PCP: Gale Cole MD         Hospital Day: 3    History of Present Illness:     Chief Complaint: Tachycardia  Isidro Hatchet is a 77 y.o.  female  who presents with Epistaxis     The patient seen and examined at bed side. she is sleepy but awakened and follows commands. Says not in any pain or SOB. Ten point ROS can not be obtained completely due to her mental status    Objective:        Intake/Output Summary (Last 24 hours) at 2021 1436  Last data filed at 2021 1051  Gross per 24 hour   Intake 10 ml   Output --   Net 10 ml      Vitals:   Vitals:    21 1333   BP:    Pulse: 127   Resp:    Temp:    SpO2:      Physical Exam:   General Appearance: alert and oriented to self and person, in no acute distress  Cardiovascular: Tachycardic, regular rhythm, normal S1 and S2  Pulmonary/Chest: clear to auscultation bilaterally  Abdomen: soft, non-tender, non-distended, normal bowel sounds, no masses   Extremities: no cyanosis, clubbing or edema, pulse   Skin: warm and dry, no rash or erythema  Head: Nose with nasal packing and some clotted blood around  Eyes: pupils equal, round, and reactive to light  Neck: supple and non-tender without mass, no thyromegaly   Musculoskeletal: normal range of motion, no joint swelling, deformity or tenderness  Neurological: sleepy, but easily awkened, oriented to self and place, normal speech, bilateral upper and lower extremity weakness(left >Right)    Medications:   Medications:    cefTRIAXone (ROCEPHIN) IV  1,000 mg Intravenous Q24H    citalopram  20 mg Oral Daily    [Held by provider] aspirin-dipyridamole  1 capsule Oral BID    insulin glargine  15 Units Subcutaneous Nightly    alogliptin  12.5 mg Oral Daily    lisinopril  2.5 mg Oral Daily    [Held by provider] metFORMIN  500 mg Oral BID     atorvastatin  10 mg Oral Daily    sodium chloride flush  10 mL Intravenous 2 times per day    carvedilol  12.5 mg Oral BID    methocarbamol  750 mg Oral BID    insulin lispro  0-6 Units Subcutaneous TID     insulin lispro  0-3 Units Subcutaneous Nightly      Infusions:    sodium chloride      dextrose       PRN Meds: sodium chloride flush, 10 mL, PRN  sodium chloride, 25 mL, PRN  promethazine, 12.5 mg, Q6H PRN    Or  ondansetron, 4 mg, Q6H PRN  magnesium hydroxide, 30 mL, Daily PRN  acetaminophen, 650 mg, Q6H PRN    Or  acetaminophen, 650 mg, Q6H PRN  HYDROcodone-acetaminophen, 1 tablet, Q6H PRN  glucose, 15 g, PRN  dextrose, 12.5 g, PRN  glucagon (rDNA), 1 mg, PRN  dextrose, 100 mL/hr, PRN            Pertinent New Labs & Imaging Studies     CBC with Differential:    Lab Results   Component Value Date    WBC 7.7 05/14/2021    RBC 4.48 05/14/2021    HGB 13.7 05/14/2021    HCT 44.2 05/14/2021     05/14/2021    MCV 98.7 05/14/2021    MCH 30.6 05/14/2021    MCHC 31.0 05/14/2021    RDW 13.6 05/14/2021    SEGSPCT 62.4 05/14/2021    LYMPHOPCT 24.4 05/14/2021    MONOPCT 11.8 05/14/2021    BASOPCT 0.5 05/14/2021    MONOSABS 0.9 05/14/2021    LYMPHSABS 1.9 05/14/2021    EOSABS 0.1 05/14/2021    BASOSABS 0.0 05/14/2021    DIFFTYPE AUTOMATED DIFFERENTIAL 05/14/2021     CMP:    Lab Results   Component Value Date     05/14/2021    K 3.8 05/14/2021     05/14/2021    CO2 20 05/14/2021    BUN 8 05/14/2021    CREATININE 0.6 05/14/2021    GFRAA >60 05/14/2021    LABGLOM >60 05/14/2021    GLUCOSE 134 05/14/2021    PROT 5.5 05/14/2021    LABALBU 3.4 05/14/2021    CALCIUM 8.4 05/14/2021    BILITOT 0.5 05/14/2021    ALKPHOS 66 05/14/2021    AST 16 05/14/2021    ALT 6 05/14/2021     Xr Chest Portable    Result Date: 5/13/2021  EXAMINATION: ONE XRAY VIEW OF THE CHEST 5/13/2021 1:22 am COMPARISON: 05/28/2018 HISTORY: ORDERING SYSTEM PROVIDED HISTORY: tachycardia TECHNOLOGIST PROVIDED HISTORY: Reason for exam:->tachycardia Reason for Exam: tachycardia Acuity: Acute Type of Exam: Initial Mechanism of Injury: tachycardia Relevant Medical/Surgical History: tachycardia FINDINGS: Head position limits evaluation the apices. Within these limitations, there is no evidence of focal consolidation or pleural effusion. No large pneumothorax. The upper mediastinum is suboptimally evaluated. The cardiac silhouette and mediastinal contours are grossly stable. No acute bony abnormality. Limited evaluation with no acute findings identified. Assessment and Plan:   Arcelia Cunningham is a 77 y.o.  female  who presents with Tachycardia    Epistaxis  Holding Aggrenox  Currently bleeding stopped with nasal packing  Added Rocephin  Monitor H&H    Sinus Tachycardia  Continue Coreg  Will add as needed IV rate controlling meds  Continue Rocephin  Pro calcitonin low  Added Fluids    Essential HTN  Continue Coreg and Lisinopril    Type 2 DM  Continue Lantus, SS insulin, Nesina and Hypoglycemia precautions    Hx of Multiple CVA  On Aggrenox, currently holding, will resume  Continue Lipitor    Depression  Continue Celexa       Monitor mentation closely, avoid sedatives, per daughter she is off her baseline with mentation. Ct head with no acute findings. If she does not improve will obtain MRI. Neurology consult placed.     Diet DIET GENERAL; Carb Control: 4 carb choices (60 gms)/meal; Dysphagia Minced and Moist   DVT Prophylaxis [] Lovenox, []  Heparin, [x] SCDs, [] Ambulation   GI Prophylaxis [] PPI,  [] H2 Blocker,  [] Carafate,  [x] Diet/Tube Feeds   Code Status Full Code   Disposition Patient requires continued admission due to AMS   MDM [] Low, [x] Moderate,[]  High     Electronically signed by Angela Inman MD on 5/14/2021 at 2:36 PM

## 2021-05-14 NOTE — PROGRESS NOTES
Speech Language Pathology  Praful Au  1945  [id number]      Attempted to see Praful Au for a bedside swallowing evaluation 05/14/21. Deferred assessment- pt was lethargic and unable to participate in PO trials at this time. ST will re-attempt.     Duglas Frausto 87, CCC-SLP, 5/14/2021

## 2021-05-15 LAB
CULTURE: ABNORMAL
CULTURE: ABNORMAL
GLUCOSE BLD-MCNC: 96 MG/DL (ref 70–99)
GLUCOSE BLD-MCNC: 98 MG/DL (ref 70–99)
GLUCOSE BLD-MCNC: 98 MG/DL (ref 70–99)
Lab: ABNORMAL
SPECIMEN: ABNORMAL

## 2021-05-15 PROCEDURE — 1200000000 HC SEMI PRIVATE

## 2021-05-15 PROCEDURE — 94761 N-INVAS EAR/PLS OXIMETRY MLT: CPT

## 2021-05-15 PROCEDURE — 82962 GLUCOSE BLOOD TEST: CPT

## 2021-05-15 PROCEDURE — 6370000000 HC RX 637 (ALT 250 FOR IP): Performed by: HOSPITALIST

## 2021-05-15 PROCEDURE — 92610 EVALUATE SWALLOWING FUNCTION: CPT

## 2021-05-15 PROCEDURE — 2580000003 HC RX 258: Performed by: INTERNAL MEDICINE

## 2021-05-15 PROCEDURE — 6370000000 HC RX 637 (ALT 250 FOR IP): Performed by: INTERNAL MEDICINE

## 2021-05-15 RX ORDER — NITROFURANTOIN 25; 75 MG/1; MG/1
100 CAPSULE ORAL EVERY 12 HOURS SCHEDULED
Status: DISCONTINUED | OUTPATIENT
Start: 2021-05-15 | End: 2021-05-18 | Stop reason: HOSPADM

## 2021-05-15 RX ADMIN — SODIUM CHLORIDE, PRESERVATIVE FREE 10 ML: 5 INJECTION INTRAVENOUS at 14:07

## 2021-05-15 RX ADMIN — CARVEDILOL 12.5 MG: 12.5 TABLET, FILM COATED ORAL at 14:46

## 2021-05-15 RX ADMIN — METHOCARBAMOL 750 MG: 500 TABLET ORAL at 14:46

## 2021-05-15 RX ADMIN — LISINOPRIL 2.5 MG: 2.5 TABLET ORAL at 14:45

## 2021-05-15 RX ADMIN — CITALOPRAM HYDROBROMIDE 20 MG: 20 TABLET ORAL at 14:45

## 2021-05-15 RX ADMIN — ALOGLIPTIN 12.5 MG: 12.5 TABLET, FILM COATED ORAL at 14:46

## 2021-05-15 RX ADMIN — SODIUM CHLORIDE, POTASSIUM CHLORIDE, SODIUM LACTATE AND CALCIUM CHLORIDE: 600; 310; 30; 20 INJECTION, SOLUTION INTRAVENOUS at 04:50

## 2021-05-15 RX ADMIN — SODIUM CHLORIDE, POTASSIUM CHLORIDE, SODIUM LACTATE AND CALCIUM CHLORIDE: 600; 310; 30; 20 INJECTION, SOLUTION INTRAVENOUS at 19:06

## 2021-05-15 RX ADMIN — NITROFURANTOIN (MONOHYDRATE/MACROCRYSTALS) 100 MG: 75; 25 CAPSULE ORAL at 14:45

## 2021-05-15 ASSESSMENT — PAIN SCALES - GENERAL
PAINLEVEL_OUTOF10: 0
PAINLEVEL_OUTOF10: 0

## 2021-05-15 NOTE — PROGRESS NOTES
Neurology Service Consult Note  [facility]  Patient Name: Isidro Hatchet  : 1945        Subjective:   Reason for consult: nose bleeding    76 y/o Female seen in follow up for altered mental status. She is alert to person this morning. She is disoriented to place and time. She follows commands and I do not appreciate any focal or lateralizing deficits on exam today. She has a fairly significant torticollis with head bent to the right. She has nitrates in her urine most likely contributing to a TME superimposed on UTI. Past Medical History:   Diagnosis Date    CVA (cerebral infarction) 112,[phone]    Diabetes mellitus ([facility] [address]     History of cardiovascular stress test 14    EF 70% No ischemia. SV function normal. Normal study.  Hx of echocardiogram 2014    normal, EF55%    Hyperlipidemia     Unspecified cerebral artery occlusion with cerebral infarction     :   History reviewed. No pertinent surgical history.   Medications:  Scheduled Meds:   nitrofurantoin (macrocrystal-monohydrate)  100 mg Oral 2 times per day    citalopram  20 mg Oral Daily    [Held by provider] aspirin-dipyridamole  1 capsule Oral BID    insulin glargine  15 Units Subcutaneous Nightly    alogliptin  12.5 mg Oral Daily    lisinopril  2.5 mg Oral Daily    [Held by provider] metFORMIN  500 mg Oral BID WC    atorvastatin  10 mg Oral Daily    sodium chloride flush  10 mL Intravenous 2 times per day    carvedilol  12.5 mg Oral BID    methocarbamol  750 mg Oral BID    insulin lispro  0-6 Units Subcutaneous TID WC    insulin lispro  0-3 Units Subcutaneous Nightly     Continuous Infusions:   lactated ringers 75 mL/hr at 05/15/21 0450    sodium chloride      dextrose       PRN Meds:.sodium chloride flush, sodium chloride, promethazine **OR** ondansetron, magnesium hydroxide, acetaminophen **OR** acetaminophen, HYDROcodone-acetaminophen, glucose, dextrose, glucagon (rDNA), dextrose    No Known Allergies  Social History Socioeconomic History    Marital status:      Spouse name: Not on file    Number of children: Not on file    Years of education: Not on file    Highest education level: Not on file   Occupational History    Not on file   Tobacco Use    Smoking status: Never Smoker    Smokeless tobacco: Never Used   Substance and Sexual Activity    Alcohol use: No    Drug use: No    Sexual activity: Not on file   Other Topics Concern    Not on file   Social History Narrative    Not on file     Social Determinants of Health     Financial Resource Strain:     Difficulty of Paying Living Expenses:    Food Insecurity:     Worried About Running Out of Food in the Last Year:     [address] in the Last Year:    Transportation Needs:     Lack of Transportation (Medical):  Lack of Transportation (Non-Medical):    Physical Activity:     Days of Exercise per Week:     Minutes of Exercise per Session:    Stress:     Feeling of Stress :    Social Connections:     Frequency of Communication with Friends and Family:     Frequency of Social Gatherings with Friends and Family:     Attends Mu-ism Services:     Active Member of Clubs or Organizations:     Attends Club or Organization Meetings:     Marital Status:    Intimate Partner Violence:     Fear of Current or Ex-Partner:     Emotionally Abused:     Physically Abused:     Sexually Abused:       Family History   Problem Relation Age of Onset    Diabetes Other         Parent    High Blood Pressure Other         Parent       Review of Symptoms:    Due to acute phase of encephalopathy patient cannot answer ROS questions     Physical Exam:         Gen: Alert, chronically torticollis right side bent neck, follows commands  HEENT: NC/AT, EOMI, PERRL, mmm, neck supple, no meningeal signs;    Heart: Regular   Lungs: no distress  Ext: no edema  Psych: does make eye contact  Skin: no rashes or lesions    NEUROLOGIC EXAM:    Mental Status: A&O to self, follows commands, names and repeats briefly     Cranial Nerve Exam:   CN II-XII: grossly intact. Nasal packing to left nostril. Motor Exam:       BUE antigravity, wiggles toes in the BLE    Deep Tendon Reflexes: 1/4 biceps, triceps, brachioradialis, patellar, and achilles b/l; flexor plantar responses b/l    Sensation: Intact light touch UE's/LE's b/l    Coordination/Cerebellum:       Tremors--none      Gait and stance:      Gait: deferred      LABS:     Recent Labs     05/13/21  0206 05/14/21  0330   WBC 7.3 7.7    140   K 3.7 3.8    108   CO2 26 20*   BUN 15 8   CREATININE 0.6 0.6   GLUCOSE 215* 134*     Results for Ewelina Rai (MRN [id number]) as of 5/14/2021 22:11   Ref. Range 5/13/2021 06:03   Color, UA Latest Ref Range: YELLOW  YELLOW   Clarity, UA Latest Ref Range: CLEAR  CLEAR   Bilirubin, Urine Latest Ref Range: NEGATIVE MG/DL NEGATIVE   Ketones, Urine Latest Ref Range: NEGATIVE MG/DL MODERATE (A)   Specific [place], UA Latest Ref Range: 1.001 - 1.035  1.015   Blood, Urine Latest Ref Range: NEGATIVE  NEGATIVE   Protein, UA Latest Ref Range: NEGATIVE MG/DL NEGATIVE   Urobilinogen, Urine Latest Ref Range: 0.2 - 1.0 MG/DL NEGATIVE   Leukocyte Esterase, Urine Latest Ref Range: NEGATIVE  NEGATIVE   Glucose, Urine Latest Ref Range: NEGATIVE MG/ (A)   Nitrite Urine, Quantitative Latest Ref Range: NEGATIVE  POSITIVE (A)   pH, Urine Latest Ref Range: 5.0 - 8.0  6.0   Mucus, UA Latest Ref Range: NEGATIVE HPF RARE (A)   WBC, UA Latest Ref Range: 0 - 5 /HPF 1   RBC, UA Latest Ref Range: 0 - 6 /HPF 1   Squam Epithel, UA Latest Units: /HPF 1   Bacteria, UA Latest Ref Range: NEGATIVE /HPF MODERATE (A)       IMAGING:    CT head non acute   No acute intracranial abnormality.       Exam limited due to difficulties with patient positioning. ASSESSMENT/PLAN:     3 77year old female with reported AMS secondary to TME superimposed on acute UA with nitrates and HTN.  No central etiology suspected. Plan of care as follows:  1. Neuro Exam:  1. Patient awake, states her name and moves all 4 extremities, follows commands  2. Neurodiagnostics:  1. CT head non acute  3. Medications:  1. Holding aggernox for bleeding  2. Statin  3. No further medications recommended from our POV  4. PT/OT/ST:  1. Per their recommendations   5. Follow up:  1. Pending course of admission         Thank you for allowing us to participate in the care of your patient. If there are any questions regarding evaluation please feel free to contact us.      OLIMPIA Truong - TOR, 5/15/2021

## 2021-05-15 NOTE — PROGRESS NOTES
Hospitalist Progress Note      Name:  Dorota Louis /Age/Sex: 1945  (77 y.o. female)   MRN & CSN:  [id number] & [id number] Admission Date/Time: 2021 10:46 PM   Location:  8785/4756-X PCP: Gabriel Cole MD         Hospital Day: 4    History of Present Illness:     Chief Complaint: Tachycardia  Dorota Louis is a 77 y.o.  female  who presents with Epistaxis     The patient seen and examined at bed side. she is sleepy but awakened and follows commands. Says not in any pain or SOB. Ten point ROS can not be obtained completely due to her mental status    Objective:        Intake/Output Summary (Last 24 hours) at 5/15/2021 1241  Last data filed at 5/15/2021 0449  Gross per 24 hour   Intake 893.75 ml   Output --   Net 893.75 ml      Vitals:   Vitals:    05/15/21 0411   BP:    Pulse: 93   Resp:    Temp:    SpO2:      Physical Exam:   General Appearance: alert and oriented to self and person, in no acute distress  Cardiovascular: Tachycardic, regular rhythm, normal S1 and S2  Pulmonary/Chest: clear to auscultation bilaterally  Abdomen: soft, non-tender, non-distended, normal bowel sounds, no masses   Extremities: no cyanosis, clubbing or edema, pulse   Skin: warm and dry, no rash or erythema  Head: Nose with nasal packing and some clotted blood around  Eyes: pupils equal, round, and reactive to light  Neck: supple and non-tender without mass, no thyromegaly   Musculoskeletal: normal range of motion, no joint swelling, deformity or tenderness  Neurological: sleepy, but easily awkened, oriented to self and place, normal speech, bilateral upper and lower extremity weakness(left >Right)    Medications:   Medications:    nitrofurantoin (macrocrystal-monohydrate)  100 mg Oral 2 times per day    citalopram  20 mg Oral Daily    [Held by provider] aspirin-dipyridamole  1 capsule Oral BID    insulin glargine  15 Units Subcutaneous Nightly    alogliptin  12.5 mg Oral Daily    lisinopril  2.5 mg Oral Daily    [Held by provider] metFORMIN  500 mg Oral BID     atorvastatin  10 mg Oral Daily    sodium chloride flush  10 mL Intravenous 2 times per day    carvedilol  12.5 mg Oral BID    methocarbamol  750 mg Oral BID    insulin lispro  0-6 Units Subcutaneous TID     insulin lispro  0-3 Units Subcutaneous Nightly      Infusions:    lactated ringers 75 mL/hr at 05/15/21 0450    sodium chloride      dextrose       PRN Meds: sodium chloride flush, 10 mL, PRN  sodium chloride, 25 mL, PRN  promethazine, 12.5 mg, Q6H PRN   Or  ondansetron, 4 mg, Q6H PRN  magnesium hydroxide, 30 mL, Daily PRN  acetaminophen, 650 mg, Q6H PRN   Or  acetaminophen, 650 mg, Q6H PRN  HYDROcodone-acetaminophen, 1 tablet, Q6H PRN  glucose, 15 g, PRN  dextrose, 12.5 g, PRN  glucagon (rDNA), 1 mg, PRN  dextrose, 100 mL/hr, PRN            Pertinent New Labs & Imaging Studies     CBC with Differential:    Lab Results   Component Value Date    WBC 7.7 05/14/2021    RBC 4.48 05/14/2021    HGB 13.7 05/14/2021    HCT 44.2 05/14/2021     05/14/2021    MCV 98.7 05/14/2021    MCH 30.6 05/14/2021    MCHC 31.0 05/14/2021    RDW 13.6 05/14/2021    SEGSPCT 62.4 05/14/2021    LYMPHOPCT 24.4 05/14/2021    MONOPCT 11.8 05/14/2021    BASOPCT 0.5 05/14/2021    MONOSABS 0.9 05/14/2021    LYMPHSABS 1.9 05/14/2021    EOSABS 0.1 05/14/2021    BASOSABS 0.0 05/14/2021    DIFFTYPE AUTOMATED DIFFERENTIAL 05/14/2021     CMP:    Lab Results   Component Value Date     05/14/2021    K 3.8 05/14/2021     05/14/2021    CO2 20 05/14/2021    BUN 8 05/14/2021    CREATININE 0.6 05/14/2021    GFRAA >60 05/14/2021    LABGLOM >60 05/14/2021    GLUCOSE 134 05/14/2021    PROT 5.5 05/14/2021    LABALBU 3.4 05/14/2021    CALCIUM 8.4 05/14/2021    BILITOT 0.5 05/14/2021    ALKPHOS 66 05/14/2021    AST 16 05/14/2021    ALT 6 05/14/2021     Xr Chest Portable    Result Date: 5/13/2021  EXAMINATION: ONE XRAY VIEW OF THE CHEST 5/13/2021 1:22 am COMPARISON: 05/28/2018 HISTORY: ORDERING SYSTEM PROVIDED HISTORY: tachycardia TECHNOLOGIST PROVIDED HISTORY: Reason for exam:->tachycardia Reason for Exam: tachycardia Acuity: Acute Type of Exam: Initial Mechanism of Injury: tachycardia Relevant Medical/Surgical History: tachycardia FINDINGS: Head position limits evaluation the apices. Within these limitations, there is no evidence of focal consolidation or pleural effusion. No large pneumothorax. The upper mediastinum is suboptimally evaluated. The cardiac silhouette and mediastinal contours are grossly stable. No acute bony abnormality. Limited evaluation with no acute findings identified. Assessment and Plan:   Josias Lewis is a 77 y.o.  female  who presents with Tachycardia    Epistaxis-Stopped  Holding Aggrenox  Currently bleeding stopped with nasal packing  Added Rocephin  Monitor H&H    Sinus Tachycardia-Improved  Continue Coreg  Will add as needed IV rate controlling meds  Continue Rocephin  Pro calcitonin low  Continue Fluids    Essential HTN  Continue Coreg and Lisinopril    Type 2 DM  Continue Lantus, SS insulin, Nesina and Hypoglycemia precautions    Hx of Multiple CVA  On Aggrenox, currently holding, will resume  Continue Lipitor    Depression  Continue Celexa     Possible UTI  Urine culture with E Coli  Changed abx to Nitrofurontoin    Monitor mentation closely, avoid sedatives, per daughter she is off her baseline with mentation. Ct head with no acute findings. If she does not improve will obtain MRI. Neurology recommendations appreciated.     Diet DIET GENERAL; Carb Control: 4 carb choices (60 gms)/meal; Dysphagia Minced and Moist   DVT Prophylaxis [] Lovenox, []  Heparin, [x] SCDs, [] Ambulation   GI Prophylaxis [] PPI,  [] H2 Blocker,  [] Carafate,  [x] Diet/Tube Feeds   Code Status Full Code   Disposition Patient requires continued admission due to [address] [] Low, [x] Moderate,[]  High     Electronically signed by Leyla Rizvi MD on 5/15/2021 at 12:41 PM

## 2021-05-15 NOTE — PROGRESS NOTES
Speech Language Pathology  Facility/Department: One [facility] SWALLOW EVALUATION    NAME: Reno Hinojosa  : 1945  MRN: [id number]    IMPRESSIONS: Reno Hinojosa was referred for a bedside swallow evaluation after being admitted to [facility] with epistaxis, sinus tachycardia. Medical hx includes HTN, DM, CVA, cervical dystonia. No known history of dysphagia prior to admission. She was seen for evaluation with partially reclined positioning, head forward and rotated to the right (consistent with baseline). She was awake/alert with inconsistent responses, reduced interaction with SLP. She produced inconsistent verbalizations throughout, inconsistently followed verbal directions (possibly influenced by decreased motivation to participate). Oral mechanism examination was limited d/t pt positioning and overall weakness, no asymmetry identified. Pt was presented with PO trials of thin liquids via straw, puree, and minced/moist solids. Oral stage mild-moderately impaired with reduced labial seal and anterior loss, slow mastication and delayed AP transit, adequate oral clearance. Pharyngeal stage WFL without s/s aspiration. Recommend continued minced and moist diet, thin liquids. Pt requires assistance with meals. Follow general aspiration precautions. SLP will follow to monitor briefly. ADMISSION DATE: 2021  ADMITTING DIAGNOSIS: has Diabetes mellitus ([address] Hyperlipidemia; Cerebral infarction ([address] Abnormal EKG; History of cardiovascular stress test; History of cardiovascular stress test; Acute respiratory failure ([address] Hypoglycemia;  Tachycardia; Urinary tract infection without hematuria; and Metabolic encephalopathy on their problem list.  ONSET DATE: this admission    Recent Chest Xray/CT of Chest: see chart    Date of Eval: 5/15/2021  Evaluating Therapist: Echo Mejia    Current Diet level:  Current Diet : Dysphagia Minced and Moist (Dysphagia II)  Current Liquid Diet : Thin      Primary Complaint  Patient Complaint: does not state    Pain:  Pain Assessment  Pain Assessment: 0-10  Pain Level: 0  Pain Assessment/FLACC  Pain Rating: FLACC (rest) - Face: no particular expression or smile  Pain Rating: FLACC (rest) - Legs: normal position or relaxed  Pain Rating: FLACC (rest) - Activity: lying quietly, normal position, moves easily  Pain Rating: FLACC (rest) - Cry: no cry (awake or asleep)  Pain Rating: FLACC (rest) - Consolability: content, relaxed  Score: FLACC (rest): 0    Reason for Referral  Stefanie Vale was referred for a bedside swallow evaluation to assess the efficiency of her swallow function, identify signs and symptoms of aspiration and make recommendations regarding safe dietary consistencies, effective compensatory strategies, and safe eating environment. Impression  Dysphagia Diagnosis: Mild to moderate oral stage dysphagia  Dysphagia Outcome Severity Scale: Level 5: Mild dysphagia- Distant supervision. May need one diet consistency restricted     Treatment Plan  Requires SLP Intervention: Yes  Duration/Frequency of Treatment: 1-2x/LOS  D/C Recommendations: To be determined;24 hour supervision/assistance       Recommended Diet and Intervention  Diet Solids Recommendation: Dysphagia Minced and Moist (Dysphagia II)  Liquid Consistency Recommendation: Thin  Recommended Form of Meds: PO     Therapeutic Interventions: Diet tolerance monitoring;Patient/Family education; Therapeutic PO trials with SLP    Compensatory Swallowing Strategies  Compensatory Swallowing Strategies: Eat/Feed slowly;Upright as possible for all oral intake;Assist feed    Treatment/Goals  Short-term Goals  Timeframe for Short-term Goals: length of admission  Goal 1: Pt will tolerate minced and moist diet/thin liquids with adequate oral manipulation/clearance and no s/s aspiration. Goal 2: Pt/caregivers will indicate understanding of all recommendations.     General  Chart Reviewed: Yes  Behavior/Cognition: Alert (slowed/inconsistent responsiveness)  Respiratory Status: Room air  Communication Observation:  (limited verbalizations, cognitive communication deficits)  Patient Positioning: Partially reclined (pt refused fully upright positioning d/t pain)  Baseline Vocal Quality: Weak  Prior Dysphagia History: previous evaluations WFL, no known history  Consistencies Administered: Dysphagia Minced and Moist (Dysphagia II); Dysphagia Pureed (Dysphagia I); Thin - straw           Vision/Hearing  Hearing  Hearing: Within functional limits    Oral Motor Deficits  Oral/Motor  Oral Motor: Exceptions to [facility]    Oral Phase Dysfunction  Oral Phase  Oral Phase: Exceptions     Indicators of Pharyngeal Phase Dysfunction   Pharyngeal Phase  Pharyngeal Phase: WFL    Prognosis  Prognosis  Prognosis for safe diet advancement: guarded  Barriers to reach goals: motivation;cognitive deficits  Individuals consulted  Consulted and agree with results and recommendations: Patient    Education  Patient Education: results/recommendations  Patient Education Response: No evidence of learning  Safety Devices in place: Yes  Type of devices:  All fall risk precautions in place       Therapy Time  SLP Individual Minutes  Time In: 0924  Time Out: 1200  Minutes: [id number]  5/15/2021 1:18 PM

## 2021-05-15 NOTE — CONSULTS
Neurology Service Consult Note  [facility]  Patient Name: Kamlesh Medina  : 1945        Subjective:   Reason for consult: nose bleeding   77 y.o. right-handed female with PMH listed below presenting to [facility] with complaints of epietaxis, she was also in acute sinus tachycardia, since her admission she has been less than alert than her baseline, family concerned about \"lethargy\", I would not say she is lethargic, she is responsive and does talk to me and follow commands, but struggles to be talkative. She has positive nitrites in her urine, she has no focal complaints and no report of seizure activity. She has no fever. Patient is limited in her ROS at this time. Past Medical History:   Diagnosis Date    CVA (cerebral infarction) 8650,0495    Diabetes mellitus [address] [address]     History of cardiovascular stress test 14    EF 70% No ischemia. SV function normal. Normal study.  Hx of echocardiogram 2014    normal, EF55%    Hyperlipidemia     Unspecified cerebral artery occlusion with cerebral infarction     :   History reviewed. No pertinent surgical history.   Medications:  Scheduled Meds:   cefTRIAXone (ROCEPHIN) IV  1,000 mg Intravenous Q24H    citalopram  20 mg Oral Daily    [Held by provider] aspirin-dipyridamole  1 capsule Oral BID    insulin glargine  15 Units Subcutaneous Nightly    alogliptin  12.5 mg Oral Daily    lisinopril  2.5 mg Oral Daily    [Held by provider] metFORMIN  500 mg Oral BID WC    atorvastatin  10 mg Oral Daily    sodium chloride flush  10 mL Intravenous 2 times per day    carvedilol  12.5 mg Oral BID    methocarbamol  750 mg Oral BID    insulin lispro  0-6 Units Subcutaneous TID     insulin lispro  0-3 Units Subcutaneous Nightly     Continuous Infusions:   lactated ringers 75 mL/hr at 21 1654    sodium chloride      dextrose       PRN Meds:.sodium chloride flush, sodium chloride, promethazine **OR** ondansetron, magnesium hydroxide, acetaminophen **OR** acetaminophen, HYDROcodone-acetaminophen, glucose, dextrose, glucagon (rDNA), dextrose    No Known Allergies  Social History     Socioeconomic History    Marital status:      Spouse name: Not on file    Number of children: Not on file    Years of education: Not on file    Highest education level: Not on file   Occupational History    Not on file   Social Needs    Financial resource strain: Not on file    Food insecurity     Worry: Not on file     Inability: Not on file    Transportation needs     Medical: Not on file     Non-medical: Not on file   Tobacco Use    Smoking status: Never Smoker    Smokeless tobacco: Never Used   Substance and Sexual Activity    Alcohol use: No    Drug use: No    Sexual activity: Not on file   Lifestyle    Physical activity     Days per week: Not on file     Minutes per session: Not on file    Stress: Not on file   Relationships    Social connections     Talks on phone: Not on file     Gets together: Not on file     Attends Taoist service: Not on file     Active member of club or organization: Not on file     Attends meetings of clubs or organizations: Not on file     Relationship status: Not on file    Intimate partner violence     Fear of current or ex partner: Not on file     Emotionally abused: Not on file     Physically abused: Not on file     Forced sexual activity: Not on file   Other Topics Concern    Not on file   Social History Narrative    Not on file      Family History   Problem Relation Age of Onset    Diabetes Other         Parent    High Blood Pressure Other         Parent       Review of Symptoms:    Due to acute phase of encephalopathy patient cannot answer ROS questions     Physical Exam:         Gen: Alert, chronically torticollis   HEENT: NC/AT, EOMI, PERRL, mmm, neck supple, no meningeal signs;    Heart: Regular   Lungs: no distress  Ext: no edema  Psych: does make eye contact  Skin: no rashes or lesions    NEUROLOGIC EXAM:    Mental Status: A&O to self, follows commands, names and repeats briefly     Cranial Nerve Exam:   CN II-XII: grossly intact. Motor Exam:       BUE antigravity, wiggles toes in the BLE    Deep Tendon Reflexes: 1/4 biceps, triceps, brachioradialis, patellar, and achilles b/l; flexor plantar responses b/l    Sensation: Intact light touch UE's/LE's b/l    Coordination/Cerebellum:       Tremors--none      Gait and stance:      Gait: deferred      LABS:     Recent Labs     05/13/21  0206 05/14/21  0330   WBC 7.3 7.7    140   K 3.7 3.8    108   CO2 26 20*   BUN 15 8   CREATININE 0.6 0.6   GLUCOSE 215* 134*     Results for Kathrine Kapoor (MRN [id number]) as of 5/14/2021 22:11   Ref. Range 5/13/2021 06:03   Color, UA Latest Ref Range: YELLOW  YELLOW   Clarity, UA Latest Ref Range: CLEAR  CLEAR   Bilirubin, Urine Latest Ref Range: NEGATIVE MG/DL NEGATIVE   Ketones, Urine Latest Ref Range: NEGATIVE MG/DL MODERATE (A)   Specific [place], UA Latest Ref Range: 1.001 - 1.035  1.015   Blood, Urine Latest Ref Range: NEGATIVE  NEGATIVE   Protein, UA Latest Ref Range: NEGATIVE MG/DL NEGATIVE   Urobilinogen, Urine Latest Ref Range: 0.2 - 1.0 MG/DL NEGATIVE   Leukocyte Esterase, Urine Latest Ref Range: NEGATIVE  NEGATIVE   Glucose, Urine Latest Ref Range: NEGATIVE MG/ (A)   Nitrite Urine, Quantitative Latest Ref Range: NEGATIVE  POSITIVE (A)   pH, Urine Latest Ref Range: 5.0 - 8.0  6.0   Mucus, UA Latest Ref Range: NEGATIVE HPF RARE (A)   WBC, UA Latest Ref Range: 0 - 5 /HPF 1   RBC, UA Latest Ref Range: 0 - 6 /HPF 1   Squam Epithel, UA Latest Units: /HPF 1   Bacteria, UA Latest Ref Range: NEGATIVE /HPF MODERATE (A)       IMAGING:    CT head non acute   No acute intracranial abnormality.       Exam limited due to difficulties with patient positioning. ASSESSMENT/PLAN:     3 77year old female with reported AMS secondary to TME superimposed on acute UA with nitrates and HTN. No central etiology suspected.  Plan of care as follows:  1. Neuro Exam:  1. Patient awake, states her name and moves all 4 extremities   2. Neurodiagnostics:  1. CT head non acute  3. Medications:  1. Holding aggernox for bleeding  2. Statin  3. No further medications recommended from our POV  4. PT/OT/ST:  1. Per their recommendations   5. Follow up:  1. Pending course of admission         Thank you for allowing us to participate in the care of your patient. If there are any questions regarding evaluation please feel free to contact us.      OLIMPIA Le - TOR, 5/14/2021

## 2021-05-16 LAB
GLUCOSE BLD-MCNC: 137 MG/DL (ref 70–99)
GLUCOSE BLD-MCNC: 141 MG/DL (ref 70–99)

## 2021-05-16 PROCEDURE — 2580000003 HC RX 258: Performed by: INTERNAL MEDICINE

## 2021-05-16 PROCEDURE — 6370000000 HC RX 637 (ALT 250 FOR IP): Performed by: INTERNAL MEDICINE

## 2021-05-16 PROCEDURE — 94761 N-INVAS EAR/PLS OXIMETRY MLT: CPT

## 2021-05-16 PROCEDURE — 96375 TX/PRO/DX INJ NEW DRUG ADDON: CPT

## 2021-05-16 PROCEDURE — 6370000000 HC RX 637 (ALT 250 FOR IP): Performed by: HOSPITALIST

## 2021-05-16 PROCEDURE — 82962 GLUCOSE BLOOD TEST: CPT

## 2021-05-16 PROCEDURE — 6360000002 HC RX W HCPCS: Performed by: INTERNAL MEDICINE

## 2021-05-16 PROCEDURE — 1200000000 HC SEMI PRIVATE

## 2021-05-16 RX ADMIN — METHOCARBAMOL 750 MG: 500 TABLET ORAL at 20:05

## 2021-05-16 RX ADMIN — METHOCARBAMOL 750 MG: 500 TABLET ORAL at 03:31

## 2021-05-16 RX ADMIN — CARVEDILOL 12.5 MG: 12.5 TABLET, FILM COATED ORAL at 10:13

## 2021-05-16 RX ADMIN — NITROFURANTOIN (MONOHYDRATE/MACROCRYSTALS) 100 MG: 75; 25 CAPSULE ORAL at 10:13

## 2021-05-16 RX ADMIN — ONDANSETRON 4 MG: 2 INJECTION INTRAMUSCULAR; INTRAVENOUS at 03:36

## 2021-05-16 RX ADMIN — CITALOPRAM HYDROBROMIDE 20 MG: 20 TABLET ORAL at 10:12

## 2021-05-16 RX ADMIN — NITROFURANTOIN (MONOHYDRATE/MACROCRYSTALS) 100 MG: 75; 25 CAPSULE ORAL at 20:04

## 2021-05-16 RX ADMIN — SODIUM CHLORIDE, POTASSIUM CHLORIDE, SODIUM LACTATE AND CALCIUM CHLORIDE: 600; 310; 30; 20 INJECTION, SOLUTION INTRAVENOUS at 20:00

## 2021-05-16 RX ADMIN — NITROFURANTOIN (MONOHYDRATE/MACROCRYSTALS) 100 MG: 75; 25 CAPSULE ORAL at 03:31

## 2021-05-16 RX ADMIN — CARVEDILOL 12.5 MG: 12.5 TABLET, FILM COATED ORAL at 20:09

## 2021-05-16 RX ADMIN — SODIUM CHLORIDE, PRESERVATIVE FREE 10 ML: 5 INJECTION INTRAVENOUS at 20:12

## 2021-05-16 RX ADMIN — ALOGLIPTIN 12.5 MG: 12.5 TABLET, FILM COATED ORAL at 10:12

## 2021-05-16 RX ADMIN — METHOCARBAMOL 750 MG: 500 TABLET ORAL at 10:13

## 2021-05-16 RX ADMIN — LISINOPRIL 2.5 MG: 2.5 TABLET ORAL at 10:13

## 2021-05-16 RX ADMIN — ATORVASTATIN CALCIUM 10 MG: 10 TABLET, FILM COATED ORAL at 20:12

## 2021-05-16 RX ADMIN — SODIUM CHLORIDE, PRESERVATIVE FREE 10 ML: 5 INJECTION INTRAVENOUS at 10:22

## 2021-05-16 ASSESSMENT — PAIN SCALES - GENERAL: PAINLEVEL_OUTOF10: 0

## 2021-05-16 NOTE — PROGRESS NOTES
Neurology Service Consult Note  [facility]  Patient Name: Paola Palencia  : 1945        Subjective:   Reason for consult: nose bleeding    76 y/o Female seen in follow up for altered mental status. She is alert to person this morning. She is disoriented to place and time may be her baseline ? [employer] She follows commands and I do not appreciate any focal or lateralizing deficits on exam today. She has a fairly significant torticollis with head bent to the right. She has nitrates in her urine most likely contributing to a TME superimposed on UTI. TME resolving looks good today. Past Medical History:   Diagnosis Date    CVA (cerebral infarction) 4189,8917    Diabetes mellitus ([facility] [address]     History of cardiovascular stress test 14    EF 70% No ischemia. SV function normal. Normal study.  Hx of echocardiogram 2014    normal, EF55%    Hyperlipidemia     Unspecified cerebral artery occlusion with cerebral infarction     :   History reviewed. No pertinent surgical history.   Medications:  Scheduled Meds:   nitrofurantoin (macrocrystal-monohydrate)  100 mg Oral 2 times per day    citalopram  20 mg Oral Daily    [Held by provider] aspirin-dipyridamole  1 capsule Oral BID    insulin glargine  15 Units Subcutaneous Nightly    alogliptin  12.5 mg Oral Daily    lisinopril  2.5 mg Oral Daily    [Held by provider] metFORMIN  500 mg Oral BID WC    atorvastatin  10 mg Oral Daily    sodium chloride flush  10 mL Intravenous 2 times per day    carvedilol  12.5 mg Oral BID    methocarbamol  750 mg Oral BID    insulin lispro  0-6 Units Subcutaneous TID WC    insulin lispro  0-3 Units Subcutaneous Nightly     Continuous Infusions:   lactated ringers 75 mL/hr at 05/15/21 1906    sodium chloride      dextrose       PRN Meds:.sodium chloride flush, sodium chloride, promethazine **OR** ondansetron, magnesium hydroxide, acetaminophen **OR** acetaminophen, HYDROcodone-acetaminophen, glucose, dextrose, glucagon (rDNA), dextrose    No Known Allergies  Social History     Socioeconomic History    Marital status:      Spouse name: Not on file    Number of children: Not on file    Years of education: Not on file    Highest education level: Not on file   Occupational History    Not on file   Tobacco Use    Smoking status: Never Smoker    Smokeless tobacco: Never Used   Substance and Sexual Activity    Alcohol use: No    Drug use: No    Sexual activity: Not on file   Other Topics Concern    Not on file   Social History Narrative    Not on file     Social Determinants of Health     Financial Resource Strain:     Difficulty of Paying Living Expenses:    Food Insecurity:     Worried About Running Out of Food in the Last Year:     [address] in the Last Year:    Transportation Needs:     Lack of Transportation (Medical):  Lack of Transportation (Non-Medical):    Physical Activity:     Days of Exercise per Week:     Minutes of Exercise per Session:    Stress:     Feeling of Stress :    Social Connections:     Frequency of Communication with Friends and Family:     Frequency of Social Gatherings with Friends and Family:     Attends Mu-ism Services:     Active Member of Clubs or Organizations:     Attends Club or Organization Meetings:     Marital Status:    Intimate Partner Violence:     Fear of Current or Ex-Partner:     Emotionally Abused:     Physically Abused:     Sexually Abused:       Family History   Problem Relation Age of Onset    Diabetes Other         Parent    High Blood Pressure Other         Parent       Review of Symptoms:    Denies fever, chills, vision changes, unilateral weakness, numbness, tingling, chest pain or sob    Physical Exam:         Gen: Alert, chronically torticollis right side bent neck, follows commands  HEENT: NC/AT, EOMI, PERRL, mmm, neck supple, no meningeal signs;    Heart: Regular   Lungs: no distress  Ext: no edema  Psych: does make eye contact  Skin: no rashes or lesions    NEUROLOGIC EXAM:    Mental Status: A&O to self, follows commands, names and repeats briefly     Cranial Nerve Exam:   CN II-XII: grossly intact. Nasal packing to left nostril. Motor Exam:       BUE antigravity, wiggles toes in the BLE    Deep Tendon Reflexes: 1/4 biceps, triceps, brachioradialis, patellar, and achilles b/l; flexor plantar responses b/l    Sensation: Intact light touch UE's/LE's b/l    Coordination/Cerebellum:       Tremors--none      Gait and stance:      Gait: deferred      LABS:     Recent Labs     05/14/21  0330   WBC 7.7      K 3.8      CO2 20*   BUN 8   CREATININE 0.6   GLUCOSE 134*     Results for Angeline De Dios (MRN [id number]) as of 5/14/2021 22:11   Ref. Range 5/13/2021 06:03   Color, UA Latest Ref Range: YELLOW  YELLOW   Clarity, UA Latest Ref Range: CLEAR  CLEAR   Bilirubin, Urine Latest Ref Range: NEGATIVE MG/DL NEGATIVE   Ketones, Urine Latest Ref Range: NEGATIVE MG/DL MODERATE (A)   Specific [place], UA Latest Ref Range: 1.001 - 1.035  1.015   Blood, Urine Latest Ref Range: NEGATIVE  NEGATIVE   Protein, UA Latest Ref Range: NEGATIVE MG/DL NEGATIVE   Urobilinogen, Urine Latest Ref Range: 0.2 - 1.0 MG/DL NEGATIVE   Leukocyte Esterase, Urine Latest Ref Range: NEGATIVE  NEGATIVE   Glucose, Urine Latest Ref Range: NEGATIVE MG/ (A)   Nitrite Urine, Quantitative Latest Ref Range: NEGATIVE  POSITIVE (A)   pH, Urine Latest Ref Range: 5.0 - 8.0  6.0   Mucus, UA Latest Ref Range: NEGATIVE HPF RARE (A)   WBC, UA Latest Ref Range: 0 - 5 /HPF 1   RBC, UA Latest Ref Range: 0 - 6 /HPF 1   Squam Epithel, UA Latest Units: /HPF 1   Bacteria, UA Latest Ref Range: NEGATIVE /HPF MODERATE (A)       IMAGING:    CT head non acute   No acute intracranial abnormality.       Exam limited due to difficulties with patient positioning.            ASSESSMENT/PLAN:     3 77year old female with reported AMS secondary to TME superimposed on acute UA with nitrates and HTN. No central etiology suspected. Plan of care as follows:  1. Neuro Exam:  1. Patient awake, states her name and moves all 4 extremities, follows commands  2. Neurodiagnostics:  1. CT head non acute  3. Medications:  1. Holding aggernox for bleeding  2. Statin  3. No further medications recommended from our POV  4. PT/OT/ST:  1. Per their recommendations   5. Follow up:  Primary care physician    We will sign off at this time as TME is resolving can be discharged from neurological standpoint. Thank you for allowing us to participate in the care of your patient. If there are any questions regarding evaluation please feel free to contact us.      OLIMPIA Lara - TOR, 5/16/2021

## 2021-05-16 NOTE — PLAN OF CARE
Problem: Skin Integrity:  Goal: Will show no infection signs and symptoms  Description: Will show no infection signs and symptoms  5/16/2021 0019 by Tavo Mixon RN  Outcome: Ongoing  5/15/2021 1603 by Rosaline Mayen RN  Outcome: Ongoing  Goal: Absence of new skin breakdown  Description: Absence of new skin breakdown  5/16/2021 0019 by Tavo Mixon RN  Outcome: Ongoing  5/15/2021 1603 by Rosaline Mayen RN  Outcome: Ongoing     Problem: Falls - Risk of:  Goal: Will remain free from falls  Description: Will remain free from falls  5/16/2021 0019 by Tavo Mixon RN  Outcome: Ongoing  5/15/2021 1603 by Rosaline Mayen RN  Outcome: Ongoing  Goal: Absence of physical injury  Description: Absence of physical injury  5/16/2021 0019 by Tavo Mixon RN  Outcome: Ongoing  5/15/2021 1603 by Rosaline Mayen RN  Outcome: Ongoing

## 2021-05-17 LAB
ANION GAP SERPL CALCULATED.3IONS-SCNC: 8 MMOL/L (ref 4–16)
BUN BLDV-MCNC: 10 MG/DL (ref 6–23)
CALCIUM SERPL-MCNC: 8.3 MG/DL (ref 8.3–10.6)
CHLORIDE BLD-SCNC: 103 MMOL/L (ref 99–110)
CO2: 29 MMOL/L (ref 21–32)
CREAT SERPL-MCNC: 0.6 MG/DL (ref 0.6–1.1)
GFR AFRICAN AMERICAN: >60 ML/MIN/1.73M2
GFR NON-AFRICAN AMERICAN: >60 ML/MIN/1.73M2
GLUCOSE BLD-MCNC: 156 MG/DL (ref 70–99)
GLUCOSE BLD-MCNC: 164 MG/DL (ref 70–99)
GLUCOSE BLD-MCNC: 169 MG/DL (ref 70–99)
GLUCOSE BLD-MCNC: 170 MG/DL (ref 70–99)
GLUCOSE BLD-MCNC: 172 MG/DL (ref 70–99)
POTASSIUM SERPL-SCNC: 3.6 MMOL/L (ref 3.5–5.1)
SODIUM BLD-SCNC: 140 MMOL/L (ref 135–145)

## 2021-05-17 PROCEDURE — 6370000000 HC RX 637 (ALT 250 FOR IP): Performed by: HOSPITALIST

## 2021-05-17 PROCEDURE — 6370000000 HC RX 637 (ALT 250 FOR IP): Performed by: INTERNAL MEDICINE

## 2021-05-17 PROCEDURE — 2580000003 HC RX 258: Performed by: INTERNAL MEDICINE

## 2021-05-17 PROCEDURE — 80048 BASIC METABOLIC PNL TOTAL CA: CPT

## 2021-05-17 PROCEDURE — 94761 N-INVAS EAR/PLS OXIMETRY MLT: CPT

## 2021-05-17 PROCEDURE — 36415 COLL VENOUS BLD VENIPUNCTURE: CPT

## 2021-05-17 PROCEDURE — 82962 GLUCOSE BLOOD TEST: CPT

## 2021-05-17 PROCEDURE — 1200000000 HC SEMI PRIVATE

## 2021-05-17 RX ORDER — CARVEDILOL 6.25 MG/1
6.25 TABLET ORAL 2 TIMES DAILY
Status: DISCONTINUED | OUTPATIENT
Start: 2021-05-17 | End: 2021-05-18 | Stop reason: HOSPADM

## 2021-05-17 RX ADMIN — ALOGLIPTIN 12.5 MG: 12.5 TABLET, FILM COATED ORAL at 10:15

## 2021-05-17 RX ADMIN — ATORVASTATIN CALCIUM 10 MG: 10 TABLET, FILM COATED ORAL at 10:16

## 2021-05-17 RX ADMIN — SODIUM CHLORIDE, PRESERVATIVE FREE 10 ML: 5 INJECTION INTRAVENOUS at 10:16

## 2021-05-17 RX ADMIN — LISINOPRIL 2.5 MG: 2.5 TABLET ORAL at 10:15

## 2021-05-17 RX ADMIN — NITROFURANTOIN (MONOHYDRATE/MACROCRYSTALS) 100 MG: 75; 25 CAPSULE ORAL at 10:15

## 2021-05-17 RX ADMIN — CITALOPRAM HYDROBROMIDE 20 MG: 20 TABLET ORAL at 10:16

## 2021-05-17 RX ADMIN — CARVEDILOL 12.5 MG: 12.5 TABLET, FILM COATED ORAL at 10:16

## 2021-05-17 RX ADMIN — INSULIN GLARGINE 15 UNITS: 100 INJECTION, SOLUTION SUBCUTANEOUS at 20:09

## 2021-05-17 RX ADMIN — CARVEDILOL 6.25 MG: 6.25 TABLET, FILM COATED ORAL at 20:09

## 2021-05-17 RX ADMIN — ASPIRIN AND EXTENDED-RELEASE DIPYRIDAMOLE 1 CAPSULE: 25; 200 CAPSULE ORAL at 20:09

## 2021-05-17 RX ADMIN — NITROFURANTOIN (MONOHYDRATE/MACROCRYSTALS) 100 MG: 75; 25 CAPSULE ORAL at 20:09

## 2021-05-17 RX ADMIN — SODIUM CHLORIDE, PRESERVATIVE FREE 10 ML: 5 INJECTION INTRAVENOUS at 20:10

## 2021-05-17 RX ADMIN — METHOCARBAMOL 750 MG: 500 TABLET ORAL at 10:15

## 2021-05-17 ASSESSMENT — PAIN SCALES - GENERAL
PAINLEVEL_OUTOF10: 0

## 2021-05-17 NOTE — PROGRESS NOTES
Progress Note  Date:2021       Room:[facility] [place]-A  Patient Luis Alvarado     YOB: 1945     Age:75 y.o. Pt mildly sleepy but can carry a conversation  Subjective    Subjective:  Symptoms:  Stable. Diet:  Adequate intake. Pain:  She reports no pain. Review of Systems  Objective         Vitals Last 24 Hours:  TEMPERATURE:  Temp  Av.3 °F (36.8 °C)  Min: 98 °F (36.7 °C)  Max: 98.5 °F (36.9 °C)  RESPIRATIONS RANGE: Resp  Av.3  Min: 17  Max: 25  PULSE OXIMETRY RANGE: SpO2  Av.8 %  Min: 91 %  Max: 93 %  PULSE RANGE: Pulse  Av  Min: 71  Max: 98  BLOOD PRESSURE RANGE: Systolic (67WIA), UVX:025 , Min:114 , GVE:993   ; Diastolic (45MJB), YHF:37, Min:47, Max:57    I/O (24Hr): Intake/Output Summary (Last 24 hours) at 2021 0737  Last data filed at 2021 0751  Gross per 24 hour   Intake 120 ml   Output --   Net 120 ml     Objective:  General Appearance:  Comfortable. Vital signs: (most recent): Blood pressure (!) 142/65, pulse 90, temperature 98.2 °F (36.8 °C), temperature source Oral, resp. rate 20, height 4' 11\" (1.499 m), weight 152 lb 1.6 oz (69 kg), SpO2 94 %, not currently breastfeeding. Vital signs are normal.    HEENT: Normal HEENT exam.  (Torticollus neck)    Lungs:  Normal effort. Heart: Normal rate. Abdomen: Bowel sounds are normal.     Extremities: Decreased range of motion. Neurological: Patient is alert. Pupils:  Pupils are equal, round, and reactive to light. Skin:  Warm. Labs/Imaging/Diagnostics    Labs:  CBC:No results for input(s): WBC, RBC, HGB, HCT, MCV, RDW, PLT in the last 72 hours. CHEMISTRIES:No results for input(s): NA, K, CL, CO2, BUN, CREATININE, GLUCOSE, PHOS, MG in the last 72 hours. Invalid input(s): CA  PT/INR:No results for input(s): PROTIME, INR in the last 72 hours. APTT:No results for input(s): APTT in the last 72 hours.   LIVER PROFILE:No results for input(s): AST, ALT, BILIDIR, BILITOT, ALKPHOS in the last 72 hours. Imaging Last 24 Hours:  No results found. Assessment//Plan           Hospital Problems         Last Modified POA    * (Principal) Tachycardia 5/13/2021 Yes    Urinary tract infection without hematuria 9/41/4549 Yes    Metabolic encephalopathy 3/61/0676 Yes        Assessment & Plan   Encephalopathy metaboblic  -CT head neg  -bld cx neg 72hrs  Epistaxis  -aggrenox held but epistaxis resolved and restart  -macrobid  Sinus tachycardia  and today had bradycardia  -on BB  -stop robaxin and adjust BB  HTN  -BB, ACE  DM  -SSI  Hx of CVA  -aggrenox and held but restart  UTI  -macrobid    Plan to discharge today  but then was perfect served that pt was bradycardic 37 and symptomatic and then HR jumped to 60. Will stop robaxin as this can cause bradycardia with her BB and lower BB.  If HR is stable for next 24hrs then can discharge tomorrow  Electronically signed by Eric Niño MD on 5/17/21 at 7:37 AM EDT

## 2021-05-17 NOTE — PLAN OF CARE
Problem: Skin Integrity:  Goal: Will show no infection signs and symptoms  Description: Will show no infection signs and symptoms  5/17/2021 1159 by Mookie Jacobsen LPN  Outcome: Completed  5/17/2021 0127 by Tess Eduardo RN  Outcome: Ongoing  Goal: Absence of new skin breakdown  Description: Absence of new skin breakdown  5/17/2021 1159 by Mookie Jacobsen LPN  Outcome: Completed  5/17/2021 0127 by Tess Eduardo RN  Outcome: Ongoing     Problem: Falls - Risk of:  Goal: Will remain free from falls  Description: Will remain free from falls  5/17/2021 1159 by Mookie Jacobsen LPN  Outcome: Completed  5/17/2021 0127 by Tess Eduardo RN  Outcome: Ongoing  Goal: Absence of physical injury  Description: Absence of physical injury  5/17/2021 1159 by Mookie Jacobsen LPN  Outcome: Completed  5/17/2021 0127 by Tess Eduardo RN  Outcome: Ongoing

## 2021-05-17 NOTE — PROGRESS NOTES
Hospitalist Progress Note      Name:  Lori Salcido /Age/Sex: 1945  (77 y.o. female)   MRN & CSN:  [id number] & [id number] Admission Date/Time: 2021 10:46 PM   Location:  6256/0868-J PCP: Vinay Cole MD         Hospital Day: 5    History of Present Illness:     Chief Complaint: Tachycardia  Lori Salcido is a 77 y.o.  female  who presents with Epistaxis     The patient seen and examined at bed side. she is more awake, alert and communicative. Denies any chest pain or SOB. Ten point ROS can not be obtained completely due to her mental status    Objective:        Intake/Output Summary (Last 24 hours) at 2021 215  Last data filed at 2021 0751  Gross per 24 hour   Intake 1020 ml   Output --   Net 1020 ml      Vitals:   Vitals:    21   BP: (!) 129/57   Pulse: 98   Resp: 25   Temp: 98 °F (36.7 °C)   SpO2: 93%     Physical Exam:   General Appearance: alert and oriented to self and person, in no acute distress  Cardiovascular: Tachycardic, regular rhythm, normal S1 and S2  Pulmonary/Chest: clear to auscultation bilaterally  Abdomen: soft, non-tender, non-distended, normal bowel sounds, no masses   Extremities: no cyanosis, clubbing or edema, pulse   Skin: warm and dry, no rash or erythema  Head: Nose with nasal packing and some clotted blood around  Eyes: pupils equal, round, and reactive to light  Neck: supple and non-tender without mass, no thyromegaly   Musculoskeletal: normal range of motion, no joint swelling, deformity or tenderness  Neurological: sleepy, but easily awkened, oriented to self and place, normal speech, bilateral upper and lower extremity weakness(left >Right)    Medications:   Medications:    nitrofurantoin (macrocrystal-monohydrate)  100 mg Oral 2 times per day    citalopram  20 mg Oral Daily    [Held by provider] aspirin-dipyridamole  1 capsule Oral BID    insulin glargine  15 Units Subcutaneous Nightly    alogliptin  12.5 mg Oral Daily    lisinopril  2.5 mg Oral Daily    [Held by provider] metFORMIN  500 mg Oral BID     atorvastatin  10 mg Oral Daily    sodium chloride flush  10 mL Intravenous 2 times per day    carvedilol  12.5 mg Oral BID    methocarbamol  750 mg Oral BID    insulin lispro  0-6 Units Subcutaneous TID     insulin lispro  0-3 Units Subcutaneous Nightly      Infusions:    lactated ringers 75 mL/hr at 05/16/21 2000    sodium chloride      dextrose       PRN Meds: sodium chloride flush, 10 mL, PRN  sodium chloride, 25 mL, PRN  promethazine, 12.5 mg, Q6H PRN   Or  ondansetron, 4 mg, Q6H PRN  magnesium hydroxide, 30 mL, Daily PRN  acetaminophen, 650 mg, Q6H PRN   Or  acetaminophen, 650 mg, Q6H PRN  HYDROcodone-acetaminophen, 1 tablet, Q6H PRN  glucose, 15 g, PRN  dextrose, 12.5 g, PRN  glucagon (rDNA), 1 mg, PRN  dextrose, 100 mL/hr, PRN            Pertinent New Labs & Imaging Studies     CBC with Differential:    Lab Results   Component Value Date    WBC 7.7 05/14/2021    RBC 4.48 05/14/2021    HGB 13.7 05/14/2021    HCT 44.2 05/14/2021     05/14/2021    MCV 98.7 05/14/2021    MCH 30.6 05/14/2021    MCHC 31.0 05/14/2021    RDW 13.6 05/14/2021    SEGSPCT 62.4 05/14/2021    LYMPHOPCT 24.4 05/14/2021    MONOPCT 11.8 05/14/2021    BASOPCT 0.5 05/14/2021    MONOSABS 0.9 05/14/2021    LYMPHSABS 1.9 05/14/2021    EOSABS 0.1 05/14/2021    BASOSABS 0.0 05/14/2021    DIFFTYPE AUTOMATED DIFFERENTIAL 05/14/2021     CMP:    Lab Results   Component Value Date     05/14/2021    K 3.8 05/14/2021     05/14/2021    CO2 20 05/14/2021    BUN 8 05/14/2021    CREATININE 0.6 05/14/2021    GFRAA >60 05/14/2021    LABGLOM >60 05/14/2021    GLUCOSE 134 05/14/2021    PROT 5.5 05/14/2021    LABALBU 3.4 05/14/2021    CALCIUM 8.4 05/14/2021    BILITOT 0.5 05/14/2021    ALKPHOS 66 05/14/2021    AST 16 05/14/2021    ALT 6 05/14/2021     Xr Chest Portable    Result Date: 5/13/2021  EXAMINATION: ONE XRAY VIEW OF THE CHEST 5/13/2021 1:22 am COMPARISON: 05/28/2018 HISTORY: ORDERING SYSTEM PROVIDED HISTORY: tachycardia TECHNOLOGIST PROVIDED HISTORY: Reason for exam:->tachycardia Reason for Exam: tachycardia Acuity: Acute Type of Exam: Initial Mechanism of Injury: tachycardia Relevant Medical/Surgical History: tachycardia FINDINGS: Head position limits evaluation the apices. Within these limitations, there is no evidence of focal consolidation or pleural effusion. No large pneumothorax. The upper mediastinum is suboptimally evaluated. The cardiac silhouette and mediastinal contours are grossly stable. No acute bony abnormality. Limited evaluation with no acute findings identified.        Assessment and Plan:   Daniella Walker is a 77 y.o.  female  who presents with Tachycardia    Epistaxis-Stopped  Holding Aggrenox  Currently bleeding stopped with nasal packing  Rocephin changed to Nitrofurontoin  Monitor H&H    Sinus Tachycardia-Improved  Continue Coreg  Will add as needed IV rate controlling meds  Rocephin changed to Nitrofurontoin  Pro calcitonin low  Continue Fluids    Essential HTN  Continue Coreg and Lisinopril    Type 2 DM  Continue Lantus, SS insulin, Nesina and Hypoglycemia precautions    Hx of Multiple CVA  On Aggrenox, currently holding, will resume  Continue Lipitor    Depression  Continue Celexa     Possible UTI  Urine culture with E Coli  Changed abx to Nitrofurontoin    Diet DIET GENERAL; Carb Control: 4 carb choices (60 gms)/meal; Dysphagia Minced and Moist   DVT Prophylaxis [] Lovenox, []  Heparin, [x] SCDs, [] Ambulation   GI Prophylaxis [] PPI,  [] H2 Blocker,  [] Carafate,  [x] Diet/Tube Feeds   Code Status Full Code   Disposition Patient requires continued admission due to AMS   MDM [] Low, [x] Moderate,[]  High     Electronically signed by Cain Echols MD on 5/16/2021 at 9:51 PM

## 2021-05-17 NOTE — CARE COORDINATION
Discuss plan remains for pt to return to [facility] [address] once medically stable. CM in and confirmed plan with pt who is awake, alert and oriented today. CM following. Pt returning to [place] at discharge. Please call report to [phone] and fax orders, AVS, and discharge summaries to [phone].

## 2021-05-17 NOTE — PLAN OF CARE
Problem: Skin Integrity:  Goal: Will show no infection signs and symptoms  Description: Will show no infection signs and symptoms  5/17/2021 0127 by Jb Chow RN  Outcome: Ongoing  5/16/2021 1747 by Shahla Freitas RN  Outcome: Ongoing  Goal: Absence of new skin breakdown  Description: Absence of new skin breakdown  5/17/2021 0127 by Jb Chow RN  Outcome: Ongoing  5/16/2021 1747 by Shahla Freitas RN  Outcome: Ongoing     Problem: Falls - Risk of:  Goal: Will remain free from falls  Description: Will remain free from falls  5/17/2021 0127 by Jb Chow RN  Outcome: Ongoing  5/16/2021 1747 by hSahla Freitas RN  Outcome: Ongoing  Goal: Absence of physical injury  Description: Absence of physical injury  5/17/2021 0127 by Jb Chow RN  Outcome: Ongoing  5/16/2021 1747 by Shahla Freitas RN  Outcome: Ongoing

## 2021-05-18 VITALS
SYSTOLIC BLOOD PRESSURE: 128 MMHG | RESPIRATION RATE: 21 BRPM | BODY MASS INDEX: 31.21 KG/M2 | TEMPERATURE: 98.4 F | HEIGHT: 59 IN | WEIGHT: 154.8 LBS | HEART RATE: 82 BPM | OXYGEN SATURATION: 93 % | DIASTOLIC BLOOD PRESSURE: 47 MMHG

## 2021-05-18 LAB
CULTURE: NORMAL
CULTURE: NORMAL
GLUCOSE BLD-MCNC: 131 MG/DL (ref 70–99)
Lab: NORMAL
Lab: NORMAL
SARS-COV-2, NAAT: NOT DETECTED
SOURCE: NORMAL
SPECIMEN: NORMAL
SPECIMEN: NORMAL

## 2021-05-18 PROCEDURE — 94761 N-INVAS EAR/PLS OXIMETRY MLT: CPT

## 2021-05-18 PROCEDURE — 87635 SARS-COV-2 COVID-19 AMP PRB: CPT

## 2021-05-18 PROCEDURE — 6370000000 HC RX 637 (ALT 250 FOR IP): Performed by: INTERNAL MEDICINE

## 2021-05-18 PROCEDURE — 6370000000 HC RX 637 (ALT 250 FOR IP): Performed by: HOSPITALIST

## 2021-05-18 PROCEDURE — 2580000003 HC RX 258: Performed by: INTERNAL MEDICINE

## 2021-05-18 PROCEDURE — 82962 GLUCOSE BLOOD TEST: CPT

## 2021-05-18 RX ORDER — NITROFURANTOIN 25; 75 MG/1; MG/1
100 CAPSULE ORAL EVERY 12 HOURS SCHEDULED
Qty: 10 CAPSULE | Refills: 0
Start: 2021-05-18 | End: 2021-05-23

## 2021-05-18 RX ORDER — LISINOPRIL 2.5 MG/1
2.5 TABLET ORAL DAILY
Qty: 30 TABLET | Refills: 0
Start: 2021-05-18 | End: 2022-08-24

## 2021-05-18 RX ORDER — CARVEDILOL 6.25 MG/1
6.25 TABLET ORAL 2 TIMES DAILY
Qty: 60 TABLET | Refills: 0
Start: 2021-05-18

## 2021-05-18 RX ADMIN — CARVEDILOL 6.25 MG: 6.25 TABLET, FILM COATED ORAL at 10:21

## 2021-05-18 RX ADMIN — NITROFURANTOIN (MONOHYDRATE/MACROCRYSTALS) 100 MG: 75; 25 CAPSULE ORAL at 10:21

## 2021-05-18 RX ADMIN — ATORVASTATIN CALCIUM 10 MG: 10 TABLET, FILM COATED ORAL at 10:21

## 2021-05-18 RX ADMIN — ASPIRIN AND EXTENDED-RELEASE DIPYRIDAMOLE 1 CAPSULE: 25; 200 CAPSULE ORAL at 10:21

## 2021-05-18 RX ADMIN — SODIUM CHLORIDE, PRESERVATIVE FREE 10 ML: 5 INJECTION INTRAVENOUS at 10:22

## 2021-05-18 RX ADMIN — ALOGLIPTIN 12.5 MG: 12.5 TABLET, FILM COATED ORAL at 10:21

## 2021-05-18 RX ADMIN — LISINOPRIL 2.5 MG: 2.5 TABLET ORAL at 10:21

## 2021-05-18 RX ADMIN — CITALOPRAM HYDROBROMIDE 20 MG: 20 TABLET ORAL at 10:21

## 2021-05-18 ASSESSMENT — PAIN SCALES - GENERAL: PAINLEVEL_OUTOF10: 0

## 2021-05-18 NOTE — CARE COORDINATION
Noted pt on discharge pending negative COVID screening. Called [facility] with stretcher secured for 2:15 pm. Updated Sakina Pino RN who states [employer] will be collected. Informed pt, adriano/Valerie [phone], Teofilo/admissions at [facility]. Packet needs AVS (once completed) and any RX. Pt returning to [facility] at discharge. Please call report to [phone] fax orders, AVS, and discharge summaries to [phone].

## 2021-05-18 NOTE — PROGRESS NOTES
Progress Note  Date:2021       [place] [place] [facility] [place]  Patient Luis Alvarado     YOB: 1945     Age:75 y.o. Awake and ready to be discharged today  Subjective    Subjective:  Symptoms:  Stable. Diet:  Adequate intake. Pain:  She reports no pain. Review of Systems  Objective         Vitals Last 24 Hours:  TEMPERATURE:  Temp  Av.6 °F (37 °C)  Min: 98.2 °F (36.8 °C)  Max: 99.2 °F (37.3 °C)  RESPIRATIONS RANGE: Resp  Av  Min: 16  Max: 24  PULSE OXIMETRY RANGE: SpO2  Av.3 %  Min: 93 %  Max: 94 %  PULSE RANGE: Pulse  Av.5  Min: 65  Max: 90  BLOOD PRESSURE RANGE: Systolic (19XQT), GSJ:169 , Min:121 , DIC:451   ; Diastolic (48SVS), HBA:26, Min:44, Max:65    I/O (24Hr): Intake/Output Summary (Last 24 hours) at 2021 0740  Last data filed at 2021  Gross per 24 hour   Intake 140 ml   Output --   Net 140 ml     Objective:  General Appearance:  Comfortable. Vital signs: (most recent): Blood pressure (!) 122/44, pulse 65, temperature 98.7 °F (37.1 °C), temperature source Oral, resp. rate 16, height 4' 11\" (1.499 m), weight 154 lb 12.8 oz (70.2 kg), SpO2 93 %, not currently breastfeeding. Vital signs are normal.    HEENT: Normal HEENT exam.  (Torticollus neck)    Lungs:  Normal effort. Heart: Normal rate. Abdomen: Bowel sounds are normal.     Extremities: Decreased range of motion. Neurological: Patient is alert. Pupils:  Pupils are equal, round, and reactive to light. Skin:  Warm. Labs/Imaging/Diagnostics    Labs:  CBC:No results for input(s): WBC, RBC, HGB, HCT, MCV, RDW, PLT in the last 72 hours. CHEMISTRIES:  Recent Labs     21  1847      K 3.6      CO2 29   BUN 10   CREATININE 0.6   GLUCOSE 169*     PT/INR:No results for input(s): PROTIME, INR in the last 72 hours. APTT:No results for input(s): APTT in the last 72 hours.   LIVER PROFILE:No results for input(s): AST, ALT, BILIDIR, BILITOT, ALKPHOS in the last 72 hours.    Imaging Last 24 Hours:  No results found. Assessment//Plan           Hospital Problems         Last Modified POA    * (Principal) Tachycardia 5/13/2021 Yes    Urinary tract infection without hematuria 6/87/8798 Yes    Metabolic encephalopathy 3/92/0197 Yes        Assessment & Plan   Encephalopathy metaboblic  -CT head neg  -bld cx neg 72hrs  Epistaxis  -aggrenox held but epistaxis resolved and restart  -macrobid  Sinus tachycardia  and today had bradycardia  -on BB  -stop robaxin and adjust BB  HTN  -BB, ACE  DM  -SSI  Hx of CVA  -aggrenox and held but restart  UTI  -macrobid    Bradycardia resolved with holding robaxin and lowering BB.  Discharge today  Electronically signed by Williams Sumner MD on 5/17/21 at 7:37 AM EDT

## 2021-05-18 NOTE — PROGRESS NOTES
Attempted to call report to [facility] with no answer. Will try again before pt leaves. 1402 attempted to called report to [facility] again, transferred with no answer.

## 2021-05-18 NOTE — DISCHARGE SUMMARY
Physician Discharge Summary     Patient ID:  Stefanie Vale  [id number]  57 y.o.  1945    Admit date: 5/12/2021    Discharge date and time: No discharge date for patient encounter. Admitting Physician: Coretta Matute MD     Discharge Physician: Yvrose Henry    Admission Diagnoses: Epistaxis [R04.0]  Tachycardia [R00.0]  Urinary tract infection without hematuria, site unspecified [N39.0]    Discharge Diagnoses: Epistaxis                                          Metabolic encephalopathy                                          UTI                                           Bradycardia and initally sinus tachycardia on admission                                           HTN                                           DM                                           Hx of CVA    Admission Condition: fair    Discharged Condition: good    Indication for Admission: Epistaxis    Hospital Course:   Nasal Bleed, found to be in sinus tachycardia and elevated BP/  Stefanie Vale is a 77 y.o.  female  who presents with Nasal Bleed, found to be in sinus tachycardia and elevated BP/     Pt [facility] term [facility] resident brought to ED due to uncontrolled nasal bleed, pt on aggrenox for h/o CVA, had nasal bleed episodes before but this one was worse than her usual. No chest pain, no dyspnea, no abd pain, no N/V, no F/C. While in ED found to have uncontrolled HTN and sinus tachycardia. She was admitted for epistaxis and her aggrenox was held with improvement. She was hypertensive and tachycardic which improved as on BB and improvement of epistaxis. Neuro consulted for encephalopathy and CT head neg. She did have UTI and treated with macrobid. Prelim bld cx neg. At discharge she did have an episode of bradycardia and her BB was decreased and robaxin was stopped and bradycardia resolved.  Adjusted and lowered diabetic insulin and medications as glucose was stable and not taking much of her diabetic medications and to avoid med induced

## 2021-05-18 NOTE — PLAN OF CARE
Problem: Infection:  Goal: Will remain free from infection  Description: Will remain free from infection  5/18/2021 1211 by Nick Poe RN  Outcome: Completed  5/18/2021 0146 by Mary Dawn RN  Outcome: Ongoing     Problem: Safety:  Goal: Free from accidental physical injury  Description: Free from accidental physical injury  5/18/2021 1211 by Nick Poe RN  Outcome: Completed  5/18/2021 0146 by Mary Dawn RN  Outcome: Ongoing  Goal: Free from intentional harm  Description: Free from intentional harm  5/18/2021 1211 by Nick Poe RN  Outcome: Completed  5/18/2021 0146 by Mary Dawn RN  Outcome: Ongoing     Problem: Daily Care:  Goal: Daily care needs are met  Description: Daily care needs are met  5/18/2021 1211 by Nick Poe RN  Outcome: Completed  5/18/2021 0146 by Mary Dawn RN  Outcome: Ongoing     Problem: Pain:  Goal: Patient's pain/discomfort is manageable  Description: Patient's pain/discomfort is manageable  5/18/2021 1211 by Nick Poe RN  Outcome: Completed  5/18/2021 0146 by Mary Dawn RN  Outcome: Ongoing     Problem: Skin Integrity:  Goal: Skin integrity will stabilize  Description: Skin integrity will stabilize  5/18/2021 1211 by Nick Poe RN  Outcome: Completed  5/18/2021 0146 by Mary Dawn RN  Outcome: Ongoing     Problem: Discharge Planning:  Goal: Patients continuum of care needs are met  Description: Patients continuum of care needs are met  5/18/2021 1211 by Nick Poe RN  Outcome: Completed  5/18/2021 0146 by Mary Dawn RN  Outcome: Ongoing

## 2021-06-01 ENCOUNTER — TELEPHONE (OUTPATIENT)
Dept: INTERNAL MEDICINE CLINIC | Age: 76
End: 2021-06-01

## 2021-06-01 ENCOUNTER — HOSPITAL ENCOUNTER (EMERGENCY)
Age: 76
Discharge: HOME | End: 2021-06-01
Attending: EMERGENCY MEDICINE
Payer: COMMERCIAL

## 2021-06-01 VITALS
SYSTOLIC BLOOD PRESSURE: 152 MMHG | RESPIRATION RATE: 18 BRPM | OXYGEN SATURATION: 96 % | HEART RATE: 84 BPM | TEMPERATURE: 98.6 F | DIASTOLIC BLOOD PRESSURE: 75 MMHG

## 2021-06-01 DIAGNOSIS — R13.10 DYSPHAGIA, UNSPECIFIED TYPE: Primary | ICD-10-CM

## 2021-06-01 PROCEDURE — 99285 EMERGENCY DEPT VISIT HI MDM: CPT

## 2021-06-01 NOTE — ED PROVIDER NOTES
eMERGENCY dEPARTMENT eNCOUnter      PCP: Terri Cole MD    [facility]    Chief Complaint   Patient presents with    Dysphagia     patients normal baseline speech thinks she may need another swallow eval as food is possibly coming out of her nose        HPI    Sherice Michael is a 77 y.o. female who presents with trouble swallowing. Reported that she has history of dysphagia. She has speech therapy at the nursing facility she resides at. She states they sent her here for no reason. She has no complaints at this time. She states she just wants to go back to the nursing facility. She states that her \"ass\" hurts and that she does not want to sit in this bed anymore. It was reported that when she swallowed today she had food come out of her nose. It was reported she appeared to be short of breath after this. She denies feeling shortness of breath. She denies fever, chest pain, cough. REVIEW OF SYSTEMS    Constitutional:  Denies fever, chills. HENT:  Denies sore throat or ear pain   Cardiovascular:  Denies chest pain, palpitations   Respiratory:  Denies cough or shortness of breath    GI:  Denies abdominal pain, nausea, vomiting, or diarrhea  :  Denies any urinary symptoms, flank pain  Musculoskeletal:  + buttock pain, no extremity pain  Skin:  Denies rash, color change  Neurologic:  Denies headache, focal weakness or sensory changes   Lymphatic:  Denies swollen glands, edema    All other review of systems are negative  See HPI and nursing notes for additional information     PAST MEDICAL AND SURGICAL HISTORY    Past Medical History:   Diagnosis Date    CVA (cerebral infarction) 6600,1147    Diabetes mellitus ([facility] [address]     History of cardiovascular stress test 5/29/14    EF 70% No ischemia. SV function normal. Normal study.  Hx of echocardiogram 6/9/2014    normal, EF55%    Hyperlipidemia     Unspecified cerebral artery occlusion with cerebral infarction      History reviewed.  No pertinent surgical history. CURRENT MEDICATIONS    Current Outpatient Rx   Medication Sig Dispense Refill    insulin lispro (HUMALOG) 100 UNIT/ML injection vial Inject 0-6 Units into the skin 3 times daily (with meals) Glucose:  Dose:     No Insulin  140-199  1 Unit  200-249  2 Units  250-299  3 Units  300-349  4 Units  350-399  5 Units  400 and above 6 Units 1 vial 0    lisinopril (PRINIVIL;ZESTRIL) 2.5 MG tablet Take 1 tablet by mouth daily 30 tablet 0    carvedilol (COREG) 6.25 MG tablet Take 1 tablet by mouth 2 times daily 60 tablet 0    metFORMIN (GLUCOPHAGE) 500 MG tablet Take 500 mg by mouth 2 times daily (with meals).  citalopram (CELEXA) 20 MG tablet Take 20 mg by mouth daily.  simvastatin (ZOCOR) 10 MG tablet Take 10 mg by mouth nightly.  Multiple Vitamins-Minerals (THERAPEUTIC MULTIVITAMIN-MINERALS) tablet Take 1 tablet by mouth daily.  calcium carbonate (OSCAL) 500 MG TABS tablet Take 400 mg by mouth daily.  dipyridamole-aspirin (AGGRENOX)  MG per SR capsule Take 1 capsule by mouth 2 times daily. ALLERGIES    No Known Allergies    SOCIAL AND FAMILY HISTORY    Social History     Socioeconomic History    Marital status:      Spouse name: None    Number of children: None    Years of education: None    Highest education level: None   Occupational History    None   Tobacco Use    Smoking status: Never Smoker    Smokeless tobacco: Never Used   Substance and Sexual Activity    Alcohol use: No    Drug use: No    Sexual activity: None   Other Topics Concern    None   Social History Narrative    None     Social Determinants of Health     Financial Resource Strain:     Difficulty of Paying Living Expenses:    Food Insecurity:     Worried About Running Out of Food in the Last Year:     Ran Out of Food in the Last Year:    Transportation Needs:     Lack of Transportation (Medical):      Lack of Transportation (Non-Medical):    Physical Activity:     Days of Exercise per Week:     Minutes of Exercise per Session:    Stress:     Feeling of Stress :    Social Connections:     Frequency of Communication with Friends and Family:     Frequency of Social Gatherings with Friends and Family:     Attends Congregational Services:     Active Member of Clubs or Organizations:     Attends Club or Organization Meetings:     Marital Status:    Intimate Partner Violence:     Fear of Current or Ex-Partner:     Emotionally Abused:     Physically Abused:     Sexually Abused:      Family History   Problem Relation Age of Onset    Diabetes Other         Parent    High Blood Pressure Other         Parent         PHYSICAL EXAM    VITAL SIGNS: BP (!) 151/72   Pulse 86   Temp 98.6 °F (37 °C)   Resp 18   SpO2 96%    Constitutional:  Well developed, No acute distress. HENT:  Normocephalic, Atraumatic, PERRL. EOMI. Sclera clear. Conjunctiva normal.  Neck: supple without ridigity  Cardiovascular:  Regular rate and rhythm, No murmurs  Respiratory:  Nonlabored breathing. Normal breath sounds, No wheezing  Abdomen: Soft, Non tender, bowel sounds present. Musculoskeletal: No edema, No tenderness. Chronic appearing kyphosis  Integument:  Warm, Dry, no rash  Neurologic:  Alert & oriented , No focal deficits noted. Speech normal.  Psychiatric:  Affect normal, Mood normal.           ED COURSE & MEDICAL DECISION MAKING       Vital signs and nursing notes reviewed during ED course. All pertinent Lab data and radiographic results reviewed with patient at bedside. The patient and/or the family were informed of the results of any tests/labs/imaging, the treatment plan, and time was allotted to answer questions. This is a 70-year-old female who presented due to report of food or drink coming out of her nose when she swallowed. She has reported history of dysphagia and is seeing speech therapy at the nursing facility.   She has no complaints at this time with the exception of the bed she is currently sitting and hurting her buttock. She denies feeling short of breath. She is 96% on room air. There are new reported neurologic symptoms. She appears in no distress. Her lung sounds are clear on my evaluation. She is just asking to go back to the nursing facility. We cannot do an emergent swallowing study here in the emergency department. She would better be served by speech therapy performing this as she is already being evaluated by at nursing facility. Provided with instruction to return with any new or concerning signs or symptoms. Clinical  IMPRESSION    Dysphagia           (Please note the MDM and HPI sections of this note were completed with a voice recognition program.  Efforts were made to edit the dictations but occasionally words are mis-transcribed.)      Mayte Schuster,   06/01/21 9446    Nurse, Milo Blue, spoke with patients daughter regarding visit today. She was asking for ENT consult and barium swallow study, neither of which I can do emergently in the ED today. It was explained that these would be outpatient tests.         Mayte Schuster,   06/01/21 1073

## 2021-06-11 ENCOUNTER — HOSPITAL ENCOUNTER (OUTPATIENT)
Dept: GENERAL RADIOLOGY | Age: 76
Discharge: HOME | End: 2021-06-11
Payer: COMMERCIAL

## 2021-06-11 ENCOUNTER — HOSPITAL ENCOUNTER (OUTPATIENT)
Dept: SPEECH THERAPY | Age: 76
Setting detail: THERAPIES SERIES
Discharge: HOME | End: 2021-06-11
Payer: COMMERCIAL

## 2021-06-11 DIAGNOSIS — R13.11 DYSPHAGIA, ORAL PHASE: ICD-10-CM

## 2021-06-11 PROCEDURE — 92611 MOTION FLUOROSCOPY/SWALLOW: CPT

## 2021-06-11 PROCEDURE — 74230 X-RAY XM SWLNG FUNCJ C+: CPT

## 2021-06-11 NOTE — PROCEDURES
INSTRUMENTAL SWALLOW REPORT  MODIFIED BARIUM SWALLOW    NAME: Boston Morales   : 1945  MRN: [id number]       Date of Eval: 2021     Ordering Physician: Dr. Emelia Cisneros To  Radiologist: available upon request     Referring Diagnosis(es): Referring Diagnosis: oral dysphagia R13.11    Past Medical History:  has a past medical history of CVA (cerebral infarction), Diabetes mellitus ([address] History of cardiovascular stress test, Hx of echocardiogram, Hyperlipidemia, and Unspecified cerebral artery occlusion with cerebral infarction. Past Surgical History:  has no past surgical history on file. Current Diet Solid Consistency: Dysphagia Pureed (Dysphagia I)  Current Diet Liquid Consistency: Thin       Type of Study: Initial MBS       Recent CXR/CT of Chest: see chart    Patient Complaints/Reason for Referral:  Boston Morales was referred for a MBS to assess the efficiency of his/her swallow function, assess for aspiration, and to make recommendations regarding safe dietary consistencies, effective compensatory strategies, and safe eating environment. Patient complaints: denies speech/swallowing complaints    Onset of problem:   Date of Onset: unclear, pt is a poor historian            Behavior/Cognition/Vision/Hearing:  Behavior/Cognition: Alert; Requires cueing  Hearing: Within functional limits    Impressions:  Treatment Dx and ICD 10: oral dysphagia R13.11   Patient Position: Lateral and Patient Degrees: 90  Consistencies Administered: Reg solid; Dysphagia Pureed (Dysphagia I); Thin teaspoon; Thin straw    Boston Morales was seen for an outpatient modified barium swallow study. She resides at [facility]. She is a poor historian. Paperwork indicates concern for nasal regurgitation during meals. Medical hx includes CVA, HTN, DM, cervical dystonia. She was seen by SLP during admission to [facility] in May 2021, and was recommended minced/moist diet with thin liquids at that time.     Pt seen for study seated upright in chair, filmed in lateral view. Participation was limited despite cuing/encouragement and education. She accepted PO trials of thin liquids via tsp/straw, puree, and regular solids. Oral stage is moderately impaired with prolonged/vertical mastication and no attempt at bolus formation with regular solid; after several minutes of oral holding, solid trial was expectorated. Pt exhibited oral holding with liquids/puree, as well as lingual pumping with pureed consistency. Eventual oral clearance adequate for thin liquids and pureed consistency. Suspect some component of oral deficits were behavioral, as pt expressed that she did not like the barium. Pharyngeal stage is [facility] with intact swallow initiation/laryngeal elevation/tongue base retraction/laryngeal vestibule closure/epiglottic inversion/palatal elevation/UES opening. Pharyngeal clearance intact. No nasopharyngeal escape identified. Unable to assess esophagus d/t body habitus and limitations in positioning d/t kyphosis. Recommend thin liquids with minced and moist vs pureed diet, to be determined by treating SLP. Further POC to be determined by treating SLP. No further outpatient SLP needs identified. Upper Esophageal Screen: unable to assess d/t pt positioning and limited participation    Dysphagia Outcome Severity Scale: Level 4: Mild moderate dysphagia- Intermittent supervision/cueing. One - two diet consistencies restricted  Penetration-Aspiration Scale (PAS): 1 - Material does not enter the airway    Recommended Diet:  Solid consistency: Dysphagia Minced and Moist (Dysphagia II); Dysphagia Pureed (Dysphagia I); Other (comment) (TBD by treating SLP)  Liquid consistency: Thin  Liquid administration via: Straw    Medication administration: Meds in puree    Safe Swallow Protocol:  Supervision: Close  Compensatory Swallowing Strategies: Upright as possible for all oral intake;Assist feed;Eat/Feed slowly; Small bites/sips              Recommendations/Treatment  Requires SLP Intervention: Yes     Recommendations comment: continue tx at [facility]  D/C Recommendations: 24 hour supervision/assistance (continued tx in SNF or LTC)         Recommended Exercises:    Therapeutic Interventions: Other (comment) (TBD by treating SLP)         Education: Images and recommendations were reviewed with Bhavani Tello following this exam.   Patient Education: results/recommendations  Patient Education Response: Needs reinforcement    Prognosis  Prognosis for safe diet advancement: guarded  Barriers to reach goals: behavior  Duration/Frequency of Treatment  Duration/Frequency of Treatment: TBD by treating SLP  Safety Devices  Safety Devices in place: Yes  Type of devices:  All fall risk precautions in place      Goals:    Long Term:      Goal 1: TBD        Short Term:     Goal 1: TBD                          Oral Preparation / Oral Phase  Oral Phase: Impaired        Pharyngeal Phase  Pharyngeal Phase: WFL      Esophageal Phase  ANDREW        Pain   Does not rate/appears comfortable         Therapy Time:   Individual Concurrent Group Co-treatment   Time In 0915         Time Out 0945         Minutes Michaela Madden CCC-SLP, 6/11/2021, 10:08 AM

## 2021-06-15 ENCOUNTER — HOSPITAL ENCOUNTER (OUTPATIENT)
Age: 76
Setting detail: SPECIMEN
Discharge: HOME | End: 2021-06-15
Payer: COMMERCIAL

## 2021-06-15 LAB
ANION GAP SERPL CALCULATED.3IONS-SCNC: 13 MMOL/L (ref 4–16)
BUN BLDV-MCNC: 7 MG/DL (ref 6–23)
CALCIUM SERPL-MCNC: 8.8 MG/DL (ref 8.3–10.6)
CHLORIDE BLD-SCNC: 104 MMOL/L (ref 99–110)
CO2: 24 MMOL/L (ref 21–32)
CREAT SERPL-MCNC: 0.6 MG/DL (ref 0.6–1.1)
ESTIMATED AVERAGE GLUCOSE: 146 MG/DL
GFR AFRICAN AMERICAN: >60 ML/MIN/1.73M2
GFR NON-AFRICAN AMERICAN: >60 ML/MIN/1.73M2
GLUCOSE BLD-MCNC: 114 MG/DL (ref 70–99)
HBA1C MFR BLD: 6.7 % (ref 4.2–6.3)
POTASSIUM SERPL-SCNC: 4.8 MMOL/L (ref 3.5–5.1)
SODIUM BLD-SCNC: 141 MMOL/L (ref 135–145)

## 2021-06-15 PROCEDURE — 36415 COLL VENOUS BLD VENIPUNCTURE: CPT

## 2021-06-15 PROCEDURE — 80048 BASIC METABOLIC PNL TOTAL CA: CPT

## 2021-06-15 PROCEDURE — 83036 HEMOGLOBIN GLYCOSYLATED A1C: CPT

## 2021-07-12 ENCOUNTER — HOSPITAL ENCOUNTER (OUTPATIENT)
Age: 76
Setting detail: SPECIMEN
Discharge: HOME | End: 2021-07-12
Payer: COMMERCIAL

## 2021-07-12 PROCEDURE — 87088 URINE BACTERIA CULTURE: CPT

## 2021-07-12 PROCEDURE — 81001 URINALYSIS AUTO W/SCOPE: CPT

## 2021-07-12 PROCEDURE — 87086 URINE CULTURE/COLONY COUNT: CPT

## 2021-07-12 PROCEDURE — 87186 SC STD MICRODIL/AGAR DIL: CPT

## 2021-07-13 LAB
BACTERIA: NEGATIVE /HPF
BILIRUBIN URINE: NEGATIVE MG/DL
BLOOD, URINE: NEGATIVE
CLARITY: ABNORMAL
COLOR: YELLOW
CULTURE: ABNORMAL
CULTURE: ABNORMAL
GLUCOSE, URINE: NEGATIVE MG/DL
KETONES, URINE: NEGATIVE MG/DL
LEUKOCYTE ESTERASE, URINE: ABNORMAL
Lab: ABNORMAL
MUCUS: ABNORMAL HPF
NITRITE URINE, QUANTITATIVE: NEGATIVE
PH, URINE: 5 (ref 5–8)
PROTEIN UA: NEGATIVE MG/DL
RBC URINE: <1 /HPF (ref 0–6)
SPECIFIC GRAVITY UA: 1.01 (ref 1–1.03)
SPECIMEN: ABNORMAL
SQUAMOUS EPITHELIAL: 2 /HPF
TRICHOMONAS: ABNORMAL /HPF
UROBILINOGEN, URINE: NEGATIVE MG/DL (ref 0.2–1)
WBC UA: 2 /HPF (ref 0–5)

## 2021-09-21 ENCOUNTER — HOSPITAL ENCOUNTER (OUTPATIENT)
Age: 76
Setting detail: SPECIMEN
Discharge: HOME | End: 2021-09-21
Payer: COMMERCIAL

## 2021-09-21 LAB
ANION GAP SERPL CALCULATED.3IONS-SCNC: 16 MMOL/L (ref 4–16)
BUN BLDV-MCNC: 9 MG/DL (ref 6–23)
CALCIUM SERPL-MCNC: 8.6 MG/DL (ref 8.3–10.6)
CHLORIDE BLD-SCNC: 104 MMOL/L (ref 99–110)
CO2: 25 MMOL/L (ref 21–32)
CREAT SERPL-MCNC: 0.5 MG/DL (ref 0.6–1.1)
DOSE AMOUNT: ABNORMAL
DOSE TIME: ABNORMAL
ESTIMATED AVERAGE GLUCOSE: 169 MG/DL
GFR AFRICAN AMERICAN: >60 ML/MIN/1.73M2
GFR NON-AFRICAN AMERICAN: >60 ML/MIN/1.73M2
GLUCOSE BLD-MCNC: 127 MG/DL (ref 70–99)
HBA1C MFR BLD: 7.5 % (ref 4.2–6.3)
HCT VFR BLD CALC: 41.9 % (ref 37–47)
HEMOGLOBIN: 12.8 GM/DL (ref 12.5–16)
MCH RBC QN AUTO: 30.9 PG (ref 27–31)
MCHC RBC AUTO-ENTMCNC: 30.5 % (ref 32–36)
MCV RBC AUTO: 101.2 FL (ref 78–100)
PDW BLD-RTO: 13.7 % (ref 11.7–14.9)
PLATELET # BLD: 231 K/CU MM (ref 140–440)
PMV BLD AUTO: 10.8 FL (ref 7.5–11.1)
POTASSIUM SERPL-SCNC: 3.4 MMOL/L (ref 3.5–5.1)
RBC # BLD: 4.14 M/CU MM (ref 4.2–5.4)
SODIUM BLD-SCNC: 145 MMOL/L (ref 135–145)
VALPROIC ACID LEVEL: <2.8 UG/ML (ref 50–100)
WBC # BLD: 5.7 K/CU MM (ref 4–10.5)

## 2021-09-21 PROCEDURE — 80164 ASSAY DIPROPYLACETIC ACD TOT: CPT

## 2021-09-21 PROCEDURE — 36415 COLL VENOUS BLD VENIPUNCTURE: CPT

## 2021-09-21 PROCEDURE — 85027 COMPLETE CBC AUTOMATED: CPT

## 2021-09-21 PROCEDURE — 80048 BASIC METABOLIC PNL TOTAL CA: CPT

## 2021-09-21 PROCEDURE — 83036 HEMOGLOBIN GLYCOSYLATED A1C: CPT

## 2021-09-24 ENCOUNTER — HOSPITAL ENCOUNTER (OUTPATIENT)
Age: 76
Setting detail: SPECIMEN
Discharge: HOME | End: 2021-09-24
Payer: COMMERCIAL

## 2021-09-24 LAB
ALBUMIN SERPL-MCNC: 3.5 GM/DL (ref 3.4–5)
ALP BLD-CCNC: 67 IU/L (ref 40–128)
ALT SERPL-CCNC: 6 U/L (ref 10–40)
ANION GAP SERPL CALCULATED.3IONS-SCNC: 13 MMOL/L (ref 4–16)
AST SERPL-CCNC: 9 IU/L (ref 15–37)
BILIRUB SERPL-MCNC: 0.4 MG/DL (ref 0–1)
BUN BLDV-MCNC: 8 MG/DL (ref 6–23)
CALCIUM SERPL-MCNC: 8.7 MG/DL (ref 8.3–10.6)
CHLORIDE BLD-SCNC: 104 MMOL/L (ref 99–110)
CO2: 27 MMOL/L (ref 21–32)
CREAT SERPL-MCNC: 0.7 MG/DL (ref 0.6–1.1)
GFR AFRICAN AMERICAN: >60 ML/MIN/1.73M2
GFR NON-AFRICAN AMERICAN: >60 ML/MIN/1.73M2
GLUCOSE BLD-MCNC: 174 MG/DL (ref 70–99)
HCT VFR BLD CALC: 41 % (ref 37–47)
HEMOGLOBIN: 12.8 GM/DL (ref 12.5–16)
MCH RBC QN AUTO: 31.1 PG (ref 27–31)
MCHC RBC AUTO-ENTMCNC: 31.2 % (ref 32–36)
MCV RBC AUTO: 99.5 FL (ref 78–100)
PDW BLD-RTO: 13.6 % (ref 11.7–14.9)
PLATELET # BLD: 235 K/CU MM (ref 140–440)
PMV BLD AUTO: 10.7 FL (ref 7.5–11.1)
POTASSIUM SERPL-SCNC: 3.6 MMOL/L (ref 3.5–5.1)
RBC # BLD: 4.12 M/CU MM (ref 4.2–5.4)
SODIUM BLD-SCNC: 144 MMOL/L (ref 135–145)
TOTAL PROTEIN: 6 GM/DL (ref 6.4–8.2)
WBC # BLD: 5.9 K/CU MM (ref 4–10.5)

## 2021-09-24 PROCEDURE — 80053 COMPREHEN METABOLIC PANEL: CPT

## 2021-09-24 PROCEDURE — 85027 COMPLETE CBC AUTOMATED: CPT

## 2021-09-24 PROCEDURE — 36415 COLL VENOUS BLD VENIPUNCTURE: CPT

## 2022-03-01 ENCOUNTER — HOSPITAL ENCOUNTER (OUTPATIENT)
Age: 77
Setting detail: SPECIMEN
Discharge: HOME | End: 2022-03-01
Payer: COMMERCIAL

## 2022-03-01 LAB
ANION GAP SERPL CALCULATED.3IONS-SCNC: 11 MMOL/L (ref 4–16)
BUN BLDV-MCNC: 12 MG/DL (ref 6–23)
CALCIUM SERPL-MCNC: 8.9 MG/DL (ref 8.3–10.6)
CHLORIDE BLD-SCNC: 105 MMOL/L (ref 99–110)
CO2: 25 MMOL/L (ref 21–32)
CREAT SERPL-MCNC: 0.6 MG/DL (ref 0.6–1.1)
ESTIMATED AVERAGE GLUCOSE: 249 MG/DL
GFR AFRICAN AMERICAN: >60 ML/MIN/1.73M2
GFR NON-AFRICAN AMERICAN: >60 ML/MIN/1.73M2
GLUCOSE BLD-MCNC: 280 MG/DL (ref 70–99)
HBA1C MFR BLD: 10.3 % (ref 4.2–6.3)
POTASSIUM SERPL-SCNC: 4.1 MMOL/L (ref 3.5–5.1)
SODIUM BLD-SCNC: 141 MMOL/L (ref 135–145)

## 2022-03-01 PROCEDURE — 36415 COLL VENOUS BLD VENIPUNCTURE: CPT

## 2022-03-01 PROCEDURE — 80048 BASIC METABOLIC PNL TOTAL CA: CPT

## 2022-03-01 PROCEDURE — 83036 HEMOGLOBIN GLYCOSYLATED A1C: CPT

## 2022-06-07 ENCOUNTER — HOSPITAL ENCOUNTER (OUTPATIENT)
Age: 77
Setting detail: SPECIMEN
Discharge: HOME | End: 2022-06-07
Payer: COMMERCIAL

## 2022-06-07 LAB
ANION GAP SERPL CALCULATED.3IONS-SCNC: 13 MMOL/L (ref 4–16)
BUN BLDV-MCNC: 17 MG/DL (ref 6–23)
CALCIUM SERPL-MCNC: 8.8 MG/DL (ref 8.3–10.6)
CHLORIDE BLD-SCNC: 104 MMOL/L (ref 99–110)
CO2: 26 MMOL/L (ref 21–32)
CREAT SERPL-MCNC: 0.7 MG/DL (ref 0.6–1.1)
ESTIMATED AVERAGE GLUCOSE: 183 MG/DL
GFR AFRICAN AMERICAN: >60 ML/MIN/1.73M2
GFR NON-AFRICAN AMERICAN: >60 ML/MIN/1.73M2
GLUCOSE BLD-MCNC: 82 MG/DL (ref 70–99)
HBA1C MFR BLD: 8 % (ref 4.2–6.3)
POTASSIUM SERPL-SCNC: 3.8 MMOL/L (ref 3.5–5.1)
SODIUM BLD-SCNC: 143 MMOL/L (ref 135–145)

## 2022-06-07 PROCEDURE — 36415 COLL VENOUS BLD VENIPUNCTURE: CPT

## 2022-06-07 PROCEDURE — 83036 HEMOGLOBIN GLYCOSYLATED A1C: CPT

## 2022-06-07 PROCEDURE — 80048 BASIC METABOLIC PNL TOTAL CA: CPT

## 2022-08-24 ENCOUNTER — HOSPITAL ENCOUNTER (OUTPATIENT)
Age: 77
Setting detail: OBSERVATION
Discharge: NURSING, REHAB OR LONG-TERM CARE | End: 2022-08-26
Attending: EMERGENCY MEDICINE
Payer: COMMERCIAL

## 2022-08-24 ENCOUNTER — APPOINTMENT (OUTPATIENT)
Dept: CT IMAGING | Age: 77
End: 2022-08-24
Payer: COMMERCIAL

## 2022-08-24 ENCOUNTER — APPOINTMENT (OUTPATIENT)
Dept: GENERAL RADIOLOGY | Age: 77
End: 2022-08-24
Payer: COMMERCIAL

## 2022-08-24 DIAGNOSIS — Z86.59 HISTORY OF DEMENTIA: ICD-10-CM

## 2022-08-24 DIAGNOSIS — I63.50 CEREBRAL INFARCTION DUE TO STENOSIS OF CEREBRAL ARTERY (HCC): ICD-10-CM

## 2022-08-24 DIAGNOSIS — R53.1 LEFT-SIDED WEAKNESS: ICD-10-CM

## 2022-08-24 DIAGNOSIS — R29.90 STROKE-LIKE SYMPTOMS: Primary | ICD-10-CM

## 2022-08-24 DIAGNOSIS — E11.9 DIABETES MELLITUS (HCC): ICD-10-CM

## 2022-08-24 DIAGNOSIS — E78.5 HYPERLIPIDEMIA: ICD-10-CM

## 2022-08-24 DIAGNOSIS — I63.9 CEREBRAL INFARCTION, UNSPECIFIED MECHANISM (HCC): ICD-10-CM

## 2022-08-24 DIAGNOSIS — R94.31 ABNORMAL EKG: ICD-10-CM

## 2022-08-24 LAB
ALBUMIN SERPL-MCNC: 3.3 GM/DL (ref 3.4–5)
ALP BLD-CCNC: 68 IU/L (ref 40–129)
ALT SERPL-CCNC: 11 U/L (ref 10–40)
ANION GAP SERPL CALCULATED.3IONS-SCNC: 10 MMOL/L (ref 4–16)
APTT: 25.9 SECONDS (ref 25.1–37.1)
AST SERPL-CCNC: 19 IU/L (ref 15–37)
BACTERIA: ABNORMAL /HPF
BASOPHILS ABSOLUTE: 0 K/CU MM
BASOPHILS RELATIVE PERCENT: 0.6 % (ref 0–1)
BILIRUB SERPL-MCNC: 0.5 MG/DL (ref 0–1)
BILIRUBIN URINE: NEGATIVE MG/DL
BLOOD, URINE: ABNORMAL
BUN BLDV-MCNC: 13 MG/DL (ref 6–23)
CALCIUM SERPL-MCNC: 8.8 MG/DL (ref 8.3–10.6)
CHLORIDE BLD-SCNC: 101 MMOL/L (ref 99–110)
CLARITY: CLEAR
CO2: 25 MMOL/L (ref 21–32)
COLOR: YELLOW
CREAT SERPL-MCNC: 0.6 MG/DL (ref 0.6–1.1)
DIFFERENTIAL TYPE: ABNORMAL
EOSINOPHILS ABSOLUTE: 0.1 K/CU MM
EOSINOPHILS RELATIVE PERCENT: 2.7 % (ref 0–3)
GFR AFRICAN AMERICAN: >60 ML/MIN/1.73M2
GFR NON-AFRICAN AMERICAN: >60 ML/MIN/1.73M2
GLUCOSE BLD-MCNC: 159 MG/DL
GLUCOSE BLD-MCNC: 159 MG/DL (ref 70–99)
GLUCOSE BLD-MCNC: 170 MG/DL (ref 70–99)
GLUCOSE BLD-MCNC: 177 MG/DL (ref 70–99)
GLUCOSE BLD-MCNC: 179 MG/DL (ref 70–99)
GLUCOSE BLD-MCNC: 189 MG/DL (ref 70–99)
GLUCOSE, URINE: NEGATIVE MG/DL
HCT VFR BLD CALC: 43.7 % (ref 37–47)
HEMOGLOBIN: 14.4 GM/DL (ref 12.5–16)
IMMATURE NEUTROPHIL %: 0.4 % (ref 0–0.43)
INR BLD: 0.98 INDEX
KETONES, URINE: NEGATIVE MG/DL
LACTATE: 1.8 MMOL/L (ref 0.4–2)
LEUKOCYTE ESTERASE, URINE: ABNORMAL
LIPASE: 17 IU/L (ref 13–60)
LYMPHOCYTES ABSOLUTE: 2.3 K/CU MM
LYMPHOCYTES RELATIVE PERCENT: 44.7 % (ref 24–44)
MAGNESIUM: 1.7 MG/DL (ref 1.8–2.4)
MCH RBC QN AUTO: 32.3 PG (ref 27–31)
MCHC RBC AUTO-ENTMCNC: 33 % (ref 32–36)
MCV RBC AUTO: 98 FL (ref 78–100)
MONOCYTES ABSOLUTE: 0.3 K/CU MM
MONOCYTES RELATIVE PERCENT: 6.4 % (ref 0–4)
MUCUS: ABNORMAL HPF
NITRITE URINE, QUANTITATIVE: NEGATIVE
NUCLEATED RBC %: 0 %
PDW BLD-RTO: 13.3 % (ref 11.7–14.9)
PH, URINE: 5.5 (ref 5–8)
PLATELET # BLD: 171 K/CU MM (ref 140–440)
PMV BLD AUTO: 11 FL (ref 7.5–11.1)
POTASSIUM SERPL-SCNC: 4.2 MMOL/L (ref 3.5–5.1)
PRO-BNP: 142.6 PG/ML
PROTEIN UA: NEGATIVE MG/DL
PROTHROMBIN TIME: 12.7 SECONDS (ref 11.7–14.5)
RBC # BLD: 4.46 M/CU MM (ref 4.2–5.4)
RBC URINE: ABNORMAL /HPF (ref 0–6)
SEGMENTED NEUTROPHILS ABSOLUTE COUNT: 2.3 K/CU MM
SEGMENTED NEUTROPHILS RELATIVE PERCENT: 45.2 % (ref 36–66)
SODIUM BLD-SCNC: 136 MMOL/L (ref 135–145)
SPECIFIC GRAVITY UA: <=1.005 (ref 1–1.03)
SQUAMOUS EPITHELIAL: 3 /HPF
TOTAL CK: 63 IU/L (ref 26–140)
TOTAL IMMATURE NEUTOROPHIL: 0.02 K/CU MM
TOTAL NUCLEATED RBC: 0 K/CU MM
TOTAL PROTEIN: 6 GM/DL (ref 6.4–8.2)
TRICHOMONAS: ABNORMAL /HPF
TROPONIN T: <0.01 NG/ML
TSH HIGH SENSITIVITY: 0.99 UIU/ML (ref 0.27–4.2)
UROBILINOGEN, URINE: 0.2 MG/DL (ref 0.2–1)
WBC # BLD: 5.1 K/CU MM (ref 4–10.5)
WBC UA: 40 /HPF (ref 0–5)

## 2022-08-24 PROCEDURE — 6360000002 HC RX W HCPCS: Performed by: NURSE PRACTITIONER

## 2022-08-24 PROCEDURE — 6370000000 HC RX 637 (ALT 250 FOR IP): Performed by: NURSE PRACTITIONER

## 2022-08-24 PROCEDURE — 2580000003 HC RX 258: Performed by: NURSE PRACTITIONER

## 2022-08-24 PROCEDURE — 96365 THER/PROPH/DIAG IV INF INIT: CPT

## 2022-08-24 PROCEDURE — 83880 ASSAY OF NATRIURETIC PEPTIDE: CPT

## 2022-08-24 PROCEDURE — 99205 OFFICE O/P NEW HI 60 MIN: CPT | Performed by: STUDENT IN AN ORGANIZED HEALTH CARE EDUCATION/TRAINING PROGRAM

## 2022-08-24 PROCEDURE — 70450 CT HEAD/BRAIN W/O DYE: CPT

## 2022-08-24 PROCEDURE — 99285 EMERGENCY DEPT VISIT HI MDM: CPT

## 2022-08-24 PROCEDURE — 6360000002 HC RX W HCPCS: Performed by: EMERGENCY MEDICINE

## 2022-08-24 PROCEDURE — 6360000004 HC RX CONTRAST MEDICATION: Performed by: EMERGENCY MEDICINE

## 2022-08-24 PROCEDURE — 71045 X-RAY EXAM CHEST 1 VIEW: CPT

## 2022-08-24 PROCEDURE — 96374 THER/PROPH/DIAG INJ IV PUSH: CPT

## 2022-08-24 PROCEDURE — 93005 ELECTROCARDIOGRAM TRACING: CPT | Performed by: EMERGENCY MEDICINE

## 2022-08-24 PROCEDURE — 84484 ASSAY OF TROPONIN QUANT: CPT

## 2022-08-24 PROCEDURE — 85025 COMPLETE CBC W/AUTO DIFF WBC: CPT

## 2022-08-24 PROCEDURE — 83735 ASSAY OF MAGNESIUM: CPT

## 2022-08-24 PROCEDURE — 80053 COMPREHEN METABOLIC PANEL: CPT

## 2022-08-24 PROCEDURE — G0378 HOSPITAL OBSERVATION PER HR: HCPCS

## 2022-08-24 PROCEDURE — 83605 ASSAY OF LACTIC ACID: CPT

## 2022-08-24 PROCEDURE — 85610 PROTHROMBIN TIME: CPT

## 2022-08-24 PROCEDURE — 81001 URINALYSIS AUTO W/SCOPE: CPT

## 2022-08-24 PROCEDURE — 83690 ASSAY OF LIPASE: CPT

## 2022-08-24 PROCEDURE — 70496 CT ANGIOGRAPHY HEAD: CPT

## 2022-08-24 PROCEDURE — 36415 COLL VENOUS BLD VENIPUNCTURE: CPT

## 2022-08-24 PROCEDURE — 96372 THER/PROPH/DIAG INJ SC/IM: CPT

## 2022-08-24 PROCEDURE — 85730 THROMBOPLASTIN TIME PARTIAL: CPT

## 2022-08-24 PROCEDURE — 84443 ASSAY THYROID STIM HORMONE: CPT

## 2022-08-24 PROCEDURE — 96367 TX/PROPH/DG ADDL SEQ IV INF: CPT

## 2022-08-24 PROCEDURE — 96375 TX/PRO/DX INJ NEW DRUG ADDON: CPT

## 2022-08-24 PROCEDURE — 82962 GLUCOSE BLOOD TEST: CPT

## 2022-08-24 PROCEDURE — 82550 ASSAY OF CK (CPK): CPT

## 2022-08-24 PROCEDURE — 94761 N-INVAS EAR/PLS OXIMETRY MLT: CPT

## 2022-08-24 RX ORDER — INSULIN GLARGINE 100 [IU]/ML
15 INJECTION, SOLUTION SUBCUTANEOUS EVERY MORNING
Status: DISCONTINUED | OUTPATIENT
Start: 2022-08-25 | End: 2022-08-25

## 2022-08-24 RX ORDER — DEXTROSE MONOHYDRATE 100 MG/ML
INJECTION, SOLUTION INTRAVENOUS CONTINUOUS PRN
Status: DISCONTINUED | OUTPATIENT
Start: 2022-08-24 | End: 2022-08-26 | Stop reason: HOSPADM

## 2022-08-24 RX ORDER — ENOXAPARIN SODIUM 100 MG/ML
40 INJECTION SUBCUTANEOUS NIGHTLY
Status: DISCONTINUED | OUTPATIENT
Start: 2022-08-24 | End: 2022-08-26 | Stop reason: HOSPADM

## 2022-08-24 RX ORDER — POLYETHYLENE GLYCOL 3350 17 G/17G
17 POWDER, FOR SOLUTION ORAL DAILY PRN
Status: DISCONTINUED | OUTPATIENT
Start: 2022-08-24 | End: 2022-08-26 | Stop reason: HOSPADM

## 2022-08-24 RX ORDER — INSULIN GLARGINE 100 [IU]/ML
15 INJECTION, SOLUTION SUBCUTANEOUS EVERY MORNING
Status: ON HOLD | COMMUNITY
End: 2022-08-26 | Stop reason: SDUPTHER

## 2022-08-24 RX ORDER — ASPIRIN AND DIPYRIDAMOLE 25; 200 MG/1; MG/1
1 CAPSULE, EXTENDED RELEASE ORAL 2 TIMES DAILY
Status: CANCELLED | OUTPATIENT
Start: 2022-08-24

## 2022-08-24 RX ORDER — POTASSIUM CHLORIDE 750 MG/1
10 CAPSULE, EXTENDED RELEASE ORAL DAILY
COMMUNITY
Start: 2022-08-09

## 2022-08-24 RX ORDER — ASPIRIN 81 MG/1
81 TABLET ORAL DAILY
Status: DISCONTINUED | OUTPATIENT
Start: 2022-08-24 | End: 2022-08-25

## 2022-08-24 RX ORDER — CARVEDILOL 6.25 MG/1
6.25 TABLET ORAL 2 TIMES DAILY
Status: DISCONTINUED | OUTPATIENT
Start: 2022-08-24 | End: 2022-08-26 | Stop reason: HOSPADM

## 2022-08-24 RX ORDER — ONDANSETRON 2 MG/ML
4 INJECTION INTRAMUSCULAR; INTRAVENOUS EVERY 30 MIN PRN
Status: DISCONTINUED | OUTPATIENT
Start: 2022-08-24 | End: 2022-08-26 | Stop reason: HOSPADM

## 2022-08-24 RX ORDER — CITALOPRAM 20 MG/1
20 TABLET ORAL DAILY
Status: CANCELLED | OUTPATIENT
Start: 2022-08-24

## 2022-08-24 RX ORDER — ATORVASTATIN CALCIUM 10 MG/1
10 TABLET, FILM COATED ORAL DAILY
Status: DISCONTINUED | OUTPATIENT
Start: 2022-08-25 | End: 2022-08-26 | Stop reason: HOSPADM

## 2022-08-24 RX ORDER — INSULIN LISPRO 100 [IU]/ML
0-4 INJECTION, SOLUTION INTRAVENOUS; SUBCUTANEOUS NIGHTLY
Status: DISCONTINUED | OUTPATIENT
Start: 2022-08-24 | End: 2022-08-26 | Stop reason: HOSPADM

## 2022-08-24 RX ORDER — INSULIN LISPRO 100 [IU]/ML
0-4 INJECTION, SOLUTION INTRAVENOUS; SUBCUTANEOUS
Status: DISCONTINUED | OUTPATIENT
Start: 2022-08-24 | End: 2022-08-26 | Stop reason: HOSPADM

## 2022-08-24 RX ORDER — MAGNESIUM SULFATE 1 G/100ML
1000 INJECTION INTRAVENOUS ONCE
Status: COMPLETED | OUTPATIENT
Start: 2022-08-24 | End: 2022-08-24

## 2022-08-24 RX ADMIN — ENOXAPARIN SODIUM 40 MG: 100 INJECTION SUBCUTANEOUS at 21:20

## 2022-08-24 RX ADMIN — CARVEDILOL 6.25 MG: 6.25 TABLET, FILM COATED ORAL at 21:20

## 2022-08-24 RX ADMIN — ONDANSETRON 4 MG: 2 INJECTION INTRAMUSCULAR; INTRAVENOUS at 09:59

## 2022-08-24 RX ADMIN — MAGNESIUM SULFATE IN DEXTROSE 1000 MG: 10 INJECTION, SOLUTION INTRAVENOUS at 12:55

## 2022-08-24 RX ADMIN — CEFTRIAXONE SODIUM 1000 MG: 1 INJECTION, POWDER, FOR SOLUTION INTRAMUSCULAR; INTRAVENOUS at 11:52

## 2022-08-24 RX ADMIN — ASPIRIN 81 MG: 81 TABLET, COATED ORAL at 17:57

## 2022-08-24 RX ADMIN — IOPAMIDOL 80 ML: 755 INJECTION, SOLUTION INTRAVENOUS at 09:14

## 2022-08-24 SDOH — ECONOMIC STABILITY: FOOD INSECURITY: WITHIN THE PAST 12 MONTHS, THE FOOD YOU BOUGHT JUST DIDN'T LAST AND YOU DIDN'T HAVE MONEY TO GET MORE.: NEVER TRUE

## 2022-08-24 SDOH — ECONOMIC STABILITY: TRANSPORTATION INSECURITY
IN THE PAST 12 MONTHS, HAS THE LACK OF TRANSPORTATION KEPT YOU FROM MEDICAL APPOINTMENTS OR FROM GETTING MEDICATIONS?: NO

## 2022-08-24 SDOH — ECONOMIC STABILITY: HOUSING INSECURITY
IN THE LAST 12 MONTHS, WAS THERE A TIME WHEN YOU DID NOT HAVE A STEADY PLACE TO SLEEP OR SLEPT IN A SHELTER (INCLUDING NOW)?: NO

## 2022-08-24 SDOH — HEALTH STABILITY: PHYSICAL HEALTH: ON AVERAGE, HOW MANY DAYS PER WEEK DO YOU ENGAGE IN MODERATE TO STRENUOUS EXERCISE (LIKE A BRISK WALK)?: 0 DAYS

## 2022-08-24 SDOH — HEALTH STABILITY: PHYSICAL HEALTH: ON AVERAGE, HOW MANY MINUTES DO YOU ENGAGE IN EXERCISE AT THIS LEVEL?: 0 MIN

## 2022-08-24 SDOH — ECONOMIC STABILITY: FOOD INSECURITY: WITHIN THE PAST 12 MONTHS, YOU WORRIED THAT YOUR FOOD WOULD RUN OUT BEFORE YOU GOT MONEY TO BUY MORE.: NEVER TRUE

## 2022-08-24 SDOH — ECONOMIC STABILITY: HOUSING INSECURITY: IN THE LAST 12 MONTHS, HOW MANY PLACES HAVE YOU LIVED?: 1

## 2022-08-24 SDOH — ECONOMIC STABILITY: TRANSPORTATION INSECURITY
IN THE PAST 12 MONTHS, HAS LACK OF TRANSPORTATION KEPT YOU FROM MEETINGS, WORK, OR FROM GETTING THINGS NEEDED FOR DAILY LIVING?: NO

## 2022-08-24 SDOH — ECONOMIC STABILITY: INCOME INSECURITY: IN THE LAST 12 MONTHS, WAS THERE A TIME WHEN YOU WERE NOT ABLE TO PAY THE MORTGAGE OR RENT ON TIME?: NO

## 2022-08-24 ASSESSMENT — PATIENT HEALTH QUESTIONNAIRE - PHQ9
2. FEELING DOWN, DEPRESSED OR HOPELESS: NOT AT ALL
SUM OF ALL RESPONSES TO PHQ9 QUESTIONS 1 & 2: 0
1. LITTLE INTEREST OR PLEASURE IN DOING THINGS: NOT AT ALL
DEPRESSION UNABLE TO ASSESS: YES

## 2022-08-24 ASSESSMENT — LIFESTYLE VARIABLES
HOW OFTEN DO YOU HAVE A DRINK CONTAINING ALCOHOL: NEVER
HOW MANY STANDARD DRINKS CONTAINING ALCOHOL DO YOU HAVE ON A TYPICAL DAY: PATIENT DOES NOT DRINK
HOW MANY STANDARD DRINKS CONTAINING ALCOHOL DO YOU HAVE ON A TYPICAL DAY: PATIENT DOES NOT DRINK
HOW OFTEN DO YOU HAVE A DRINK CONTAINING ALCOHOL: NEVER

## 2022-08-24 ASSESSMENT — ENCOUNTER SYMPTOMS
NAUSEA: 1
ALLERGIC/IMMUNOLOGIC NEGATIVE: 1
RESPIRATORY NEGATIVE: 1
EYES NEGATIVE: 1

## 2022-08-24 ASSESSMENT — SOCIAL DETERMINANTS OF HEALTH (SDOH)
HOW OFTEN DO YOU ATTEND CHURCH OR RELIGIOUS SERVICES?: NEVER
WITHIN THE LAST YEAR, HAVE TO BEEN RAPED OR FORCED TO HAVE ANY KIND OF SEXUAL ACTIVITY BY YOUR PARTNER OR EX-PARTNER?: NO
HOW HARD IS IT FOR YOU TO PAY FOR THE VERY BASICS LIKE FOOD, HOUSING, MEDICAL CARE, AND HEATING?: NOT HARD AT ALL
WITHIN THE LAST YEAR, HAVE YOU BEEN KICKED, HIT, SLAPPED, OR OTHERWISE PHYSICALLY HURT BY YOUR PARTNER OR EX-PARTNER?: NO
WITHIN THE LAST YEAR, HAVE YOU BEEN HUMILIATED OR EMOTIONALLY ABUSED IN OTHER WAYS BY YOUR PARTNER OR EX-PARTNER?: NO
HOW OFTEN DO YOU ATTENT MEETINGS OF THE CLUB OR ORGANIZATION YOU BELONG TO?: 1 TO 4 TIMES PER YEAR
HOW OFTEN DO YOU GET TOGETHER WITH FRIENDS OR RELATIVES?: THREE TIMES A WEEK
WITHIN THE LAST YEAR, HAVE YOU BEEN AFRAID OF YOUR PARTNER OR EX-PARTNER?: NO
IN A TYPICAL WEEK, HOW MANY TIMES DO YOU TALK ON THE PHONE WITH FAMILY, FRIENDS, OR NEIGHBORS?: THREE TIMES A WEEK
DO YOU BELONG TO ANY CLUBS OR ORGANIZATIONS SUCH AS CHURCH GROUPS UNIONS, FRATERNAL OR ATHLETIC GROUPS, OR SCHOOL GROUPS?: NO

## 2022-08-24 NOTE — ED NOTES
Medication History  [facility]    Patient Name: Adolfo Vigil 1945     Medication history has been completed by: Barney Walters CPhT    Source(s) of information: Phone Message from Nursing Staff at [address] Physician: Bibiana Cole MD     Pharmacy:     Allergies as of 08/24/2022    (No Known Allergies)        Prior to Admission medications    Medication Sig Start Date End Date Taking? Authorizing Provider   insulin glargine (BASAGLAR KWIKPEN) 100 UNIT/ML injection pen Inject 15 Units into the skin every morning   Yes Historical Provider, MD   potassium chloride (MICRO-K) 10 MEQ extended release capsule Take 10 mEq by mouth daily 8/9/22   Historical Provider, MD   carvedilol (COREG) 6.25 MG tablet Take 1 tablet by mouth 2 times daily 5/18/21   Caitlyn Chris MD   Multiple Vitamins-Minerals (THERAPEUTIC MULTIVITAMIN-MINERALS) tablet Take 1 tablet by mouth daily. Historical Provider, MD     Medications added or changed (ex. new medication, dosage change, interval change, formulation change):  Basaglar 15 units every morning (added)  Micro K 10 MEQ daily (added)    Medications removed from list (include reason, ex. noncompliance, medication cost, therapy complete etc.):   Simvastatin not receiving per nursing facility (ordered)  Aggrenox not receiving per nursing facility (ordered)  Citalopram not receiving per nursing facility (ordered)  Calcium carbonate not receiving per nursing facility  Humalog not receiving per nursing facility   Metformin not receiving per nursing facility     Medications requiring reconciliation with provider:    Basaglar 15 units every morning (added)  Micro K 10 MEQ daily (added)  Simvastatin not receiving per nursing facility (ordered)  Aggrenox not receiving per nursing facility (ordered)  Citalopram not receiving per nursing facility (ordered)    Comments:  Received phone call from nursing facility. Medication list reviewed.  Per nursing staff patient has not received medications for today.     To my knowledge the above medication history is accurate as of 8/24/2022 1:09 PM.   Queta Gao CPhT   8/24/2022 1:09 PM

## 2022-08-24 NOTE — ED NOTES
Have reached out to [address] [place] [address] multiple times to obtain a MAR. Messages left to return call or fax information.     Respectfully submitted,    Rodrigo Jaimes CPhT

## 2022-08-24 NOTE — ED NOTES
DAUGHTER CALLED AND WOULD LIKE UPDATE ON MOTHER   GM GOLDBS ([phone])     Lay Barbosa  08/24/22 7742

## 2022-08-24 NOTE — H&P
History and Physical      Name:  Reno Hinojosa /Age/Sex: 1945  (77 y.o. female)   MRN & CSN:  [id number] & [id number] Admission Date/Time: 2022  8:52 AM   Location:  [facility] [place] [address] PCP: Martínez Cole MD       Hospital Day: 1           Assessment and Plan:   # Left sided weakness/Hx CVA -pt with left sided weakness, left facial droop and dysarthria, Last known well 1 hr prior to arrival to ED. Stroke alert initiated. Stroke alert team, [facility] did not deem pt TPA candidate. CT head inconclussive due to motion artifact, repeat pending. CTA head non diagnostic due to motion degradation. CTA neck showed bilateral carotids appeared patent. Unable to accurately score NIHS due to Dementia and limited mobility with chronic debilitation. Neurology c/s. MRI brain as per Neurology recs. Will obtain TTE ordered. Monitor on tele. Continue neuro checks, speech/swallow eval, PT/OT. Check tsh and lipid profile. Manage anti-platelet, statin. PT/OT    # UTI -IV Ceftriaxone, f/u urine cx adjust abx per sensitivities    # Hypomagnesemia - Replacement ordered    # Essential HTN - Will not aggressively treat pending MRI brain    # DM type II - Manage on ssi, monitor need for basal insulin. Monitor BG AC/HS, ADA diet    # Dementia - supportive care, not on chronic medications    # Possible thyroid nodule - ? 1.5 cm thyroid isthmus nodule noted on CTA head/neck imaging. Non emergent US recommended, can be done OP, f/u TSH     # Hx Cervical dystonia      Present on Admission:   Left-sided weakness             Diet No diet orders on file   DVT Prophylaxis [x] Lovenox, []  Heparin, [] SCDs, [] Ambulation  [] Long term AC   GI Prophylaxis [x] PPI,  [] H2 Blocker,  [] Carafate,  [] Diet/Tube Feeds   Code Status Full code    Disposition Admit to med surg obsv.     Patient plans to return ECF upon discharge         Chief Complaint: Extremity Weakness (L sided weakness), Facial Droop (L sided), and Aphasia      History obtained from EHR    History of Present Illness:   Isidro Hatchet is a 77 y.o. female  with history of CVA, HTN, DM II, Hyperlipdemia, Dementia, cervical dystonia who present from the nursing home with left sided weakness and dyarthria. Reportedly symptoms onset 1 hr prior to arrival. She presented to ED afebrile, pulse 76, respirations 21, /64, O2 sat 95% RA. Stroke alert initiated. CT head and CTA head were non diagnostic due to motion degradation. CTA neck showed the bilateral carotid arteries appear patent without significant stenosis, non-diagnostic evaluation of the bilateral vertebral arteries secondary to significant motion degradation and patient positioning; other findings below. Per ED Provider patient was not deemed TPA candidate. EKG showed ST, rate 108, nonspecific ST abn, trop negative x1, . CXR limited, non acute. Chemistry panel showed Mg 1.7, glucose 179 otherwise unremarkable. LFT's showed alb 3.3 protein 6.0 otherwise unremarkable. CBC showed normal WBC and Hgb. UA  showed large leukocytes, 40 WBC, no RBC. The patient has been admitted for further evaluation and treatment. Ten point ROS: reviewed and are negative, unless as noted in above HPI. Objective:   No intake or output data in the 24 hours ending 08/24/22 1150     Vitals:   Vitals:    08/24/22 0858 08/24/22 0920 08/24/22 1033 08/24/22 1131   BP: (!) 150/64 115/80 (!) 135/115 (!) 137/93   Pulse: 76 99 (!) 122 98   Resp: 21 18  19   Temp:  97.6 °F (36.4 °C)     TempSrc:  Axillary     SpO2: 95% 98%  94%       Physical Exam: 08/24/22     GEN -Awake  appearing female, in NAD. Appears given age. EYES -anicteric, conjunctiva pink. HENT -Head is normocephalic, atraumatic. MM are moist.  NECK -Supple, limited ROM due to cervical dystonia hx   RESP -decreased bs, no wheezing or rhochi. Symmetric chest movement   C/V -S1/S2 auscultated. RRR without appreciable M/R/G. No JVD. Cap refill <3 sec. No peripheral edema. GI -Abdomen is soft non distended, non tender to palpation. + BS. No masses or guarding.  -Attends in place no flank tenderness. LYMPH- No petechiae or ecchymoses. MS -Cervical dystonia, kyphosis  SKIN -Normal coloration, warm, dry. NEURO-generlized weakness with left sided weaker than right, Left facial droop present and dysarthria  PSYC-Awake, alert, oriented to self . Appropriate affect. Past Medical History:      Past Medical History:   Diagnosis Date    CVA (cerebral infarction) 7695,1116    Diabetes mellitus ([facility] [address]     History of cardiovascular stress test 5/29/14    EF 70% No ischemia. SV function normal. Normal study.  Hx of echocardiogram 6/9/2014    normal, EF55%    Hyperlipidemia     Unspecified cerebral artery occlusion with cerebral infarction        [facility] reviewed  Past Surgical  History:    has no past surgical history on file. colonoscopy  Surgical history reviewed  Family  History:   family history includes Diabetes in an other family member; High Blood Pressure in an other family member. Family history reviewed  Social History:     Social History     Socioeconomic History    Marital status:      Spouse name: None    Number of children: None    Years of education: None    Highest education level: None   Tobacco Use    Smoking status: Never    Smokeless tobacco: Never   Substance and Sexual Activity    Alcohol use: No    Drug use: No      reports that she has never smoked. She has never used smokeless tobacco.   reports no history of alcohol use. reports no history of drug use. Social history reviewed  Allergies:   No Known Allergies    Home Medications:     Prior to Admission medications    Medication Sig Start Date End Date Taking?  Authorizing Provider   insulin lispro (HUMALOG) 100 UNIT/ML injection vial Inject 0-6 Units into the skin 3 times daily (with meals) Glucose:  Dose:     No Insulin  140-199  1 Unit  200-249  2 Units  250-299  3 Units  300-349  4 Units  350-399  5 Units  400 and above 6 Units 5/18/21   Fer Tillman MD   lisinopril (PRINIVIL;ZESTRIL) 2.5 MG tablet Take 1 tablet by mouth daily 5/18/21   Fer Tillman MD   carvedilol (COREG) 6.25 MG tablet Take 1 tablet by mouth 2 times daily 5/18/21   Fer Tillman MD   metFORMIN (GLUCOPHAGE) 500 MG tablet Take 500 mg by mouth 2 times daily (with meals). Historical Provider, MD   citalopram (CELEXA) 20 MG tablet Take 20 mg by mouth daily. Historical Provider, MD   simvastatin (ZOCOR) 10 MG tablet Take 10 mg by mouth nightly. Historical Provider, MD   Multiple Vitamins-Minerals (THERAPEUTIC MULTIVITAMIN-MINERALS) tablet Take 1 tablet by mouth daily. Historical Provider, MD   calcium carbonate (OSCAL) 500 MG TABS tablet Take 400 mg by mouth daily. Historical Provider, MD   dipyridamole-aspirin (AGGRENOX)  MG per SR capsule Take 1 capsule by mouth 2 times daily. Historical Provider, MD         Medications:   Medications:    cefTRIAXone (ROCEPHIN) IV  1,000 mg IntraVENous Q24H    magnesium sulfate  1,000 mg IntraVENous Once      Infusions:   PRN Meds: ondansetron, 4 mg, Q30 Min PRN        Data:     Laboratory this visit:  Reviewed  Recent Labs     08/24/22  0923   WBC 5.1   HGB 14.4   HCT 43.7         Recent Labs     08/24/22  0923      K 4.2      CO2 25   BUN 13   CREATININE 0.6     Recent Labs     08/24/22  0923   AST 19   ALT 11   BILITOT 0.5   ALKPHOS 68     Recent Labs     08/24/22  0923   INR 0.98         Radiology this visit:  Reviewed. CT HEAD WO CONTRAST    Result Date: 8/24/2022  EXAMINATION: CT OF THE HEAD WITHOUT CONTRAST  8/24/2022 8:56 am TECHNIQUE: CT of the head was performed without the administration of intravenous contrast. Automated exposure control, iterative reconstruction, and/or weight based adjustment of the mA/kV was utilized to reduce the radiation dose to as low as reasonably achievable.  COMPARISON: 05/14/2021 HISTORY: ORDERING SYSTEM PROVIDED HISTORY: HEAD TRAUMA, CLOSED, MILD, GCS >= 13, NO RISK FACTORS, NEURO EXAM NORMAL TECHNOLOGIST PROVIDED HISTORY: Has a \"code stroke\" or \"stroke alert\" been called? ->Yes Reason for exam:->L sided weakness Decision Support Exception - unselect if not a suspected or confirmed emergency medical condition->Emergency Medical Condition (MA) Reason for Exam: L sided weakness, Head trauma, closed, mild, GCS >= 13, no risk factors, neuro exam normal FINDINGS: Nondiagnostic evaluation secondary to significant motion degradation and patient positioning. No acute intracranial abnormality in the bilateral parietal and occipital regions. However, evaluation of remaining regions is nondiagnostic. Acute hemorrhage cannot be confidently excluded. Involutional parenchymal changes with microvascular ischemic disease. Chronic right cerebellar encephalomalacia/gliosis. Bilateral maxillary sinus retention cyst.  The mastoid air cells appear clear. Nondiagnostic evaluation secondary to significant motion degradation and patient positioning. Acute hemorrhage cannot be confidently excluded. Can consider repeat evaluation. Findings discussed with Dr. Winston Odom on 08/24/2022 at 9:28 a.m.     XR CHEST PORTABLE    Result Date: 8/24/2022  EXAMINATION: ONE X-RAY VIEW OF THE CHEST 8/24/2022 8:57 am COMPARISON: 05/13/2021. HISTORY: ORDERING SYSTEM PROVIDED HISTORY: L sided weakness TECHNOLOGIST PROVIDED HISTORY: Reason for exam:->L sided weakness Reason for Exam: L sided weakness Additional signs and symptoms: L sided weakness Relevant Medical/Surgical History: L sided weakness FINDINGS: Significantly limited exam secondary to patient's head overlying a large portion of the right hemithorax and small portion of the left lung apex. Within this limitation: Visualized lung fields appear clear. No pleural effusions are seen. No left pneumothorax is identified. No definite large pneumothorax seen on the right.   Cardiac and mediastinal silhouettes are stable. To the extent that they can be assessed no acute bony abnormality is seen. Limited exam without acute abnormality identified. CTA HEAD NECK W CONTRAST    Result Date: 8/24/2022  EXAMINATION: CTA OF THE HEAD AND NECK WITH CONTRAST 8/24/2022 9:12 am: TECHNIQUE: CTA of the head and neck was performed with the administration of intravenous contrast. Multiplanar reformatted images are provided for review. MIP images are provided for review. Stenosis of the internal carotid arteries measured using NASCET criteria. Automated exposure control, iterative reconstruction, and/or weight based adjustment of the mA/kV was utilized to reduce the radiation dose to as low as reasonably achievable. COMPARISON: No CTA comparison available. HISTORY: ORDERING SYSTEM PROVIDED HISTORY: L sided weakness TECHNOLOGIST PROVIDED HISTORY: Reason for exam:->L sided weakness Has a \"code stroke\" or \"stroke alert\" been called? ->Yes Decision Support Exception - unselect if not a suspected or confirmed emergency medical condition->Emergency Medical Condition (MA) FINDINGS: CTA NECK: Limited evaluation secondary to patient positioning and motion degradation. AORTIC ARCH/ARCH VESSELS: No dissection or arterial injury. No significant stenosis of the brachiocephalic or subclavian arteries. CAROTID ARTERIES: No dissection, arterial injury, or hemodynamically significant stenosis by NASCET criteria. Retropharyngeal course of the bilateral internal carotid arteries, anatomic variant. VERTEBRAL ARTERIES: The proximal vertebral arteries appear patent without significant stenosis. Evaluation of the remaining segments is nondiagnostic secondary to motion degradation. SOFT TISSUES: The lung apices are clear. No cervical or superior mediastinal lymphadenopathy. The larynx and pharynx are unremarkable. No acute abnormality of the salivary and thyroid glands. Questionable 1.5 cm thyroid isthmus nodule.  BONES: Age-indeterminate T1 superior endplate compression fracture deformity versus degenerative Schmorl's node formation. Chronic T3 and T6 compression fracture deformities. Exaggerated kyphosis of the cervical spine. CTA HEAD: Nondiagnostic CTA head evaluation secondary to significant motion degradation. CTA neck: 1. The bilateral carotid arteries appear patent without significant stenosis. 2. Nondiagnostic evaluation of the bilateral vertebral arteries secondary to significant motion degradation and patient positioning. 3. Age-indeterminate T1 superior endplate compression fracture deformity versus degenerative Schmorl's node formation. Chronic T3 and T6 compression fracture deformities. 4. Questionable 1.5 cm thyroid isthmus nodule. Recommend correlation with non-emergent thyroid ultrasound. CTA head: 1. Nondiagnostic CTA head evaluation secondary to significant motion degradation. RECOMMENDATIONS: 1.5 cm incidental thyroid nodule. Recommend thyroid US. Reference: J Am Katarina Radiol.  2015 Feb;12(2): 143-50           EKG this visit:  personally reviewed         Electronically signed by OLIMPIA Granados CNP on 8/24/2022 at 11:50 AM

## 2022-08-24 NOTE — CONSULTS
Neurology Service Consult Note  [facility]  Patient Name: Adolfo Vigil  : 1945        Subjective:   Reason for consult: Left sided weakness  77 y.o. female with history of strokes, DM, HLD, dementia, torticollis presenting to [facility] from [facility] with left sided weakness. Stroke alert was called and patient was evaluated by [facility]. She was not found to be tPA/TNK candidate. Patient is currently on aggrenox. CT head was completed and due to motion artifact was considered inconclusive and will be repeated. Patient was seen and assessed. She is awake and alert to self. She is pleasantly confused. She is noted to have significant torticollis with head down and to the right. Speech is mildly slurred, language is fluent but confused. She is easily agitated during exam. No family at bedside. Past Medical History:   Diagnosis Date    CVA (cerebral infarction) 4083,4336    Diabetes mellitus ([facility] [address]     History of cardiovascular stress test 14    EF 70% No ischemia. SV function normal. Normal study. Hx of echocardiogram 2014    normal, EF55%    Hyperlipidemia     Unspecified cerebral artery occlusion with cerebral infarction     :   Past Surgical History:   Procedure Laterality Date    SKIN BIOPSY       Medications:  Scheduled Meds:   cefTRIAXone (ROCEPHIN) IV  1,000 mg IntraVENous Q24H    enoxaparin  40 mg SubCUTAneous Daily    atorvastatin  10 mg Oral Daily    insulin lispro  0-4 Units SubCUTAneous TID WC    insulin lispro  0-4 Units SubCUTAneous Nightly    aspirin  81 mg Oral Daily     Continuous Infusions:  PRN Meds:.ondansetron, polyethylene glycol    No Known Allergies  Social History     Socioeconomic History    Marital status:       Spouse name: Not on file    Number of children: 2    Years of education: Not on file    Highest education level: Not on file   Occupational History    Not on file   Tobacco Use    Smoking status: Never    Smokeless tobacco: Never   Substance from Last 3 Encounters:   08/24/22 (!) 167/154   06/01/21 (!) 152/75   05/18/21 (!) 128/47     Pulse Readings from Last 3 Encounters:   08/24/22 (!) 117   06/01/21 84   05/18/21 82        Gen: A&O x self, NAD, cooperative  HEENT: NC/AT, EOMI, PERRL, mmm, no carotid bruits, neck supple, no meningeal signs, torticollis  Heart: NSR  Lungs: Respirations even and unlabored  Ext: no edema, no calf tenderness b/l  Psych: pleasantly confused  Skin: no rashes or lesions    NEUROLOGIC EXAM:    Mental Status: A&O to self only NAD, speech slightly slurred, language fluent, repetition and naming intact, follows commands appropriately    Cranial Nerve Exam:   CN II-XII:  PERRL, VFF, no nystagmus, no gaze paresis, sensation V1-V3 intact b/l, muscles of facial expression symmetric difficult to assess secondary to torticollis; hearing intact to conversational tone, palate elevates symmetrically, shoulder elevation symmetric and tongue protrudes midline with movement side to side. Motor Exam:       Strength 5/5 UE's/LE's b/l  Tone and bulk normal   No pronator drift    Deep Tendon Reflexes: 2/4 biceps, triceps, brachioradialis, 1/4 patellar, and achilles b/l; flexor plantar responses b/l    Sensation: Intact light touch/pinprick/vibration UE's/LE's b/l    Coordination/Cerebellum:       Tremors--none      Rapidly alternating movements: no dysdiadochokinesia b/l                Heel-to-Shin: no dysmetria b/l      Finger-to-Nose: no dysmetria b/l    Gait and stance:      Gait: deferred      LABS:     Recent Labs     08/24/22  0923   WBC 5.1      K 4.2      CO2 25   BUN 13   CREATININE 0.6   GLUCOSE 179*  159   INR 0.98       IMAGING:    CT head w/o contrast:      Impression   Nondiagnostic evaluation secondary to significant motion degradation and   patient positioning. Acute hemorrhage cannot be confidently excluded. Can   consider repeat evaluation. CTA head and neck  Impression   CTA neck:       1.  The bilateral carotid arteries appear patent without significant stenosis. 2. Nondiagnostic evaluation of the bilateral vertebral arteries secondary to   significant motion degradation and patient positioning. 3. Age-indeterminate T1 superior endplate compression fracture deformity   versus degenerative Schmorl's node formation. Chronic T3 and T6 compression   fracture deformities. 4. Questionable 1.5 cm thyroid isthmus nodule. Recommend correlation with   non-emergent thyroid ultrasound. CTA head:       1. Nondiagnostic CTA head evaluation secondary to significant motion   degradation. All imaging was personally reviewed. ASSESSMENT/PLAN:   77year old female presenting with left sided weakness. On exam patient is alert to self only. She is able to follow commands. Speech is mildly slurred. No focal weakness noted. Because of torticollis difficult to assess facial symmetry. Becomes agitated easily. Left sided weakness secondary to TIA v toxic encephalopathy with UTI. Neuroimaging as above  Do not feel patient would tolerate MRI brain well and it would not change current treatment  Continue aspirin, lipitor   UTI management per IM  No further recommendations. Neurology will sign off. Please contact us with any new concerns. > 60 minutes of time spent included chart review, obtaining history, patient examination, developing plan of care, and documentation. Patient was discussed with attending neurologist Dr. Ronnie Valencia. Thank you for allowing us to participate in the care of your patient. If there are any questions regarding evaluation please feel free to contact us. OLIMPIA Rizzo CNP, 8/24/2022     ------------------------------------    Attending Note:  I have rounded on this patient with MICHAELA Arias. I have reviewed the chart and we have discussed this case in detail. The patient was seen and examined by myself. Pertinent labs and imaging have been personally reviewed.    Our findings and impressions were discussed with the patient. I concur with the Nurse Specialist's assessment and plan. Patient presented after having reported weakness at nursing facility. There is report that there is focal left-sided weakness, however, on evaluation in the emergency department this was not appreciated on examination. On my examination she does seem to move the left arm less frequently than the right but is able to move both arms antigravity when prompted. Bilateral lower extremity strength is symmetrical.  CT head was nonacute but was largely motion degraded. Given patient's profound spasmodic torticollis and dementia, I do not feel that she is going to tolerate an MRI brain which quite frankly will not change my medical management. If this does represent a TIA, she is already on antiplatelet therapy and statin. My leading consideration is that this actually represents a toxic encephalopathy secondary to a urinary tract infection. Continue antiplatelet and statin. Do not feel that any further neuroimaging/neurodiagnostics are warranted at this time. Neurology will sign off. Please do not hesitate to contact us with further questions.     Reyna Zhao DO 8/13/2022 2:08 PM

## 2022-08-24 NOTE — PROGRESS NOTES
Received report from [facility], [address] [place] [address]. Pt arrived to unit via gurney from ED. Pt stable, oriented to room, daughter at bedside.

## 2022-08-25 LAB
ANION GAP SERPL CALCULATED.3IONS-SCNC: 11 MMOL/L (ref 4–16)
BUN BLDV-MCNC: 13 MG/DL (ref 6–23)
CALCIUM SERPL-MCNC: 9 MG/DL (ref 8.3–10.6)
CHLORIDE BLD-SCNC: 104 MMOL/L (ref 99–110)
CHOLESTEROL: 186 MG/DL
CO2: 26 MMOL/L (ref 21–32)
CREAT SERPL-MCNC: 0.6 MG/DL (ref 0.6–1.1)
EKG ATRIAL RATE: 108 BPM
EKG DIAGNOSIS: NORMAL
EKG P AXIS: 52 DEGREES
EKG P-R INTERVAL: 160 MS
EKG Q-T INTERVAL: 346 MS
EKG QRS DURATION: 78 MS
EKG QTC CALCULATION (BAZETT): 463 MS
EKG R AXIS: -43 DEGREES
EKG T AXIS: 35 DEGREES
EKG VENTRICULAR RATE: 108 BPM
ESTIMATED AVERAGE GLUCOSE: 174 MG/DL
GFR AFRICAN AMERICAN: >60 ML/MIN/1.73M2
GFR NON-AFRICAN AMERICAN: >60 ML/MIN/1.73M2
GLUCOSE BLD-MCNC: 127 MG/DL (ref 70–99)
GLUCOSE BLD-MCNC: 133 MG/DL (ref 70–99)
GLUCOSE BLD-MCNC: 152 MG/DL (ref 70–99)
GLUCOSE BLD-MCNC: 186 MG/DL (ref 70–99)
GLUCOSE BLD-MCNC: 197 MG/DL (ref 70–99)
HBA1C MFR BLD: 7.7 % (ref 4.2–6.3)
HCT VFR BLD CALC: 45.8 % (ref 37–47)
HDLC SERPL-MCNC: 56 MG/DL
HEMOGLOBIN: 15.1 GM/DL (ref 12.5–16)
LDL CHOLESTEROL CALCULATED: 114 MG/DL
LV EF: 58 %
LVEF MODALITY: NORMAL
MAGNESIUM: 2 MG/DL (ref 1.8–2.4)
MCH RBC QN AUTO: 32 PG (ref 27–31)
MCHC RBC AUTO-ENTMCNC: 33 % (ref 32–36)
MCV RBC AUTO: 97 FL (ref 78–100)
PDW BLD-RTO: 13.2 % (ref 11.7–14.9)
PLATELET # BLD: 194 K/CU MM (ref 140–440)
PMV BLD AUTO: 11.6 FL (ref 7.5–11.1)
POTASSIUM SERPL-SCNC: 4.3 MMOL/L (ref 3.5–5.1)
RBC # BLD: 4.72 M/CU MM (ref 4.2–5.4)
SODIUM BLD-SCNC: 141 MMOL/L (ref 135–145)
TRIGL SERPL-MCNC: 79 MG/DL
WBC # BLD: 5.4 K/CU MM (ref 4–10.5)

## 2022-08-25 PROCEDURE — 6370000000 HC RX 637 (ALT 250 FOR IP): Performed by: NURSE PRACTITIONER

## 2022-08-25 PROCEDURE — 85027 COMPLETE CBC AUTOMATED: CPT

## 2022-08-25 PROCEDURE — 96372 THER/PROPH/DIAG INJ SC/IM: CPT

## 2022-08-25 PROCEDURE — 2580000003 HC RX 258: Performed by: NURSE PRACTITIONER

## 2022-08-25 PROCEDURE — 97166 OT EVAL MOD COMPLEX 45 MIN: CPT

## 2022-08-25 PROCEDURE — 82962 GLUCOSE BLOOD TEST: CPT

## 2022-08-25 PROCEDURE — 93010 ELECTROCARDIOGRAM REPORT: CPT | Performed by: INTERNAL MEDICINE

## 2022-08-25 PROCEDURE — 92610 EVALUATE SWALLOWING FUNCTION: CPT

## 2022-08-25 PROCEDURE — 97530 THERAPEUTIC ACTIVITIES: CPT

## 2022-08-25 PROCEDURE — 94761 N-INVAS EAR/PLS OXIMETRY MLT: CPT

## 2022-08-25 PROCEDURE — 93306 TTE W/DOPPLER COMPLETE: CPT

## 2022-08-25 PROCEDURE — 96366 THER/PROPH/DIAG IV INF ADDON: CPT

## 2022-08-25 PROCEDURE — G0378 HOSPITAL OBSERVATION PER HR: HCPCS

## 2022-08-25 PROCEDURE — 83735 ASSAY OF MAGNESIUM: CPT

## 2022-08-25 PROCEDURE — 36415 COLL VENOUS BLD VENIPUNCTURE: CPT

## 2022-08-25 PROCEDURE — 80061 LIPID PANEL: CPT

## 2022-08-25 PROCEDURE — 80048 BASIC METABOLIC PNL TOTAL CA: CPT

## 2022-08-25 PROCEDURE — 6360000002 HC RX W HCPCS: Performed by: NURSE PRACTITIONER

## 2022-08-25 PROCEDURE — 83036 HEMOGLOBIN GLYCOSYLATED A1C: CPT

## 2022-08-25 PROCEDURE — 97162 PT EVAL MOD COMPLEX 30 MIN: CPT

## 2022-08-25 RX ORDER — ASPIRIN AND DIPYRIDAMOLE 25; 200 MG/1; MG/1
1 CAPSULE, EXTENDED RELEASE ORAL 2 TIMES DAILY
Status: DISCONTINUED | OUTPATIENT
Start: 2022-08-25 | End: 2022-08-26 | Stop reason: HOSPADM

## 2022-08-25 RX ORDER — INSULIN GLARGINE 100 [IU]/ML
10 INJECTION, SOLUTION SUBCUTANEOUS EVERY MORNING
Status: DISCONTINUED | OUTPATIENT
Start: 2022-08-26 | End: 2022-08-26 | Stop reason: HOSPADM

## 2022-08-25 RX ADMIN — CEFTRIAXONE SODIUM 1000 MG: 1 INJECTION, POWDER, FOR SOLUTION INTRAMUSCULAR; INTRAVENOUS at 15:02

## 2022-08-25 RX ADMIN — ENOXAPARIN SODIUM 40 MG: 100 INJECTION SUBCUTANEOUS at 22:17

## 2022-08-25 RX ADMIN — CARVEDILOL 6.25 MG: 6.25 TABLET, FILM COATED ORAL at 22:17

## 2022-08-25 RX ADMIN — ASPIRIN 81 MG: 81 TABLET, COATED ORAL at 11:23

## 2022-08-25 RX ADMIN — CARVEDILOL 6.25 MG: 6.25 TABLET, FILM COATED ORAL at 11:23

## 2022-08-25 RX ADMIN — ATORVASTATIN CALCIUM 10 MG: 10 TABLET, FILM COATED ORAL at 11:23

## 2022-08-25 NOTE — PROGRESS NOTES
Speech Language Pathology  Facility/Department: [facility] 4E   CLINICAL BEDSIDE SWALLOW EVALUATION    NAME: Tyesha Pedraza  : 1945  MRN: [id number]    ADMISSION DATE: 2022  ADMITTING DIAGNOSIS: has Diabetes mellitus ([facility] [address] Hyperlipidemia; Cerebral infarction ([facility] [address] Abnormal EKG; History of cardiovascular stress test; History of cardiovascular stress test; Acute respiratory failure ([facility] [address] Hypoglycemia; Tachycardia; Urinary tract infection without hematuria; Metabolic encephalopathy; and Left-sided weakness on their problem list.    Impressions: Tyesha Pedraza was seen for a bedside swallowing evaluation after being admitted to [facility] with left-sided weakness. Pt was alert throughout assessment. Relevant medical hx includes dementia, CVA with left sided residual weakness and thyroid nodule. Pt had a modified barium swallow study completed 2021 which indicated no aspiration with all textures given. She was on a soft diet at baseline. For today's assessment pt was positioned upright in bed and presented with a clear vocal quality. Oral mechanism examination indicated labial asymmetry with reduced labial/lingual strength and coordination. PO trials of puree, soft solids and thin liquids by straw sips were given. Moderate oral dysphagia was observed characterized by prolonged/inadequate mastication, lingual pumping and right-sided buccal pocketing of soft solids. Pharyngeal swallow appeared intact characterized by timely swallow initiation and 0 overt s/s of aspiration observed with all PO trials given. Recommend downgrade diet to pureed solids/thin liquids with aspiration precautions. SLP will continue to follow.        ONSET DATE: this admission     Date of Eval: 2022  Evaluating Therapist: NOELLE Olivares    Current Diet level:  Current Diet : Regular      Primary Complaint  Patient Complaint: uncomfortable with positioning    Pain:  Pain Assessment  Pain Assessment: None - Denies Pain    Reason for Referral  Isidro Hatchet was referred for a bedside swallow evaluation to assess the efficiency of her swallow function, identify signs and symptoms of aspiration and make recommendations regarding safe dietary consistencies, effective compensatory strategies, and safe eating environment. Impression  Dysphagia Diagnosis: Moderate oral stage dysphagia;Mild pharyngeal stage dysphagia  Dysphagia Outcome Severity Scale: Level 4: Mild moderate dysphagia- Intermittent supervision/cueing. One - two diet consistencies restricted     Treatment Plan  Requires SLP Intervention: Yes  Duration of Treatment: 1-2xs weekly  D/C Recommendations: To be determined       Recommended Diet and Intervention        Recommended Form of Meds: PO     Therapeutic Interventions: Diet tolerance monitoring; Therapeutic PO trials with SLP    Compensatory Swallowing Strategies  Compensatory Swallowing Strategies : Remain upright for 30-45 minutes after meals;Eat/Feed slowly;Upright as possible for all oral intake;Assist feed    Treatment/Goals  Short-term Goals  Timeframe for Short-term Goals: LOS or until goals are met  Goal 1: Pt will tolerate pureed solids/thin liquids without clinical evidence of aspiration 100%  Goal 2: Pt/caregivers will demonstrate comprehension of recommendations/POC    General  Chart Reviewed: Yes  Behavior/Cognition: Alert; Cooperative  Respiratory Status: Room air  O2 Device: None (Room air)  Communication Observation: Functional  Follows Directions: Simple  Dentition: Adequate; Some missing teeth  Patient Positioning: Upright in bed  Baseline Vocal Quality: Normal  Prior Dysphagia History: MBSS in 2021- no aspiration of thins  Consistencies Administered: Soft and Bite-Sized;Pureed; Thin - straw           Vision/Hearing  Vision  Vision: Within Functional Limits  Hearing  Hearing: Within functional limits    Oral Motor Deficits       Oral Phase Dysfunction  Oral Phase  Oral Phase: Exceptions Indicators of Pharyngeal Phase Dysfunction        Prognosis  Individuals consulted  Consulted and agree with results and recommendations: Patient    Education  Patient Education: recommendations/POC  Patient Education Response: Needs reinforcement  Safety Devices in place: Yes  Type of devices:  All fall risk precautions in place       Therapy Time  SLP Individual Minutes  Time In: 0845  Time Out: 5016  Minutes: [address], [facility]-SLP, 8/25/2022

## 2022-08-25 NOTE — PROGRESS NOTES
Disposition Patient requires continued admission due to repeat CT head, stroke r/o   Surrogate Decision Maker/ POA      Subjective:     Chief Complaint: Extremity Weakness (L sided weakness), Facial Droop (L sided), and Aphasia       Patient seen and examined at bedside. Reporting discomfort in bed. States left-sided weakness is worse. She is only alert and oriented to self and place, not to time and minimally to situation. Denies other acute complaints      Review of Systems:    Ten point ROS reviewed negative, unless as noted above    Objective:   No intake or output data in the 24 hours ending 08/25/22 0748     Vitals:   Vitals:    08/25/22 0524   BP: 128/70   Pulse: 81   Resp: 18   Temp:    SpO2: 94%       Physical Exam:   PHYSICAL EXAM   GEN Awake female, sitting upright in bed in no apparent distress. Appears given age. EYES Pupils are equally round. Gaze conjugate. HENT Mucous membranes are moist.  Severe torticollis. NECK Supple, no apparent thyromegaly or masses. RESP Respirations even and unlabored on RA, clear to auscultation bilaterally   CARDIO/VASC Regular rate without appreciable murmurs. GI Abdomen is soft without significant tenderness, masses, or guarding.   Garibay catheter is not present. MSK No gross joint deformities. SKIN Normal coloration, warm, dry. NEURO Cranial nerves appear grossly intact, normal speech, strength 3/5 in LUE, 4/5 in LLE. 5/5 in RUE, RLE  PSYCH Awake, alert, oriented x 2. Affect appropriate.     Medications:   Medications:    cefTRIAXone (ROCEPHIN) IV  1,000 mg IntraVENous Q24H    enoxaparin  40 mg SubCUTAneous Nightly    atorvastatin  10 mg Oral Daily    insulin lispro  0-4 Units SubCUTAneous TID     insulin lispro  0-4 Units SubCUTAneous Nightly    aspirin  81 mg Oral Daily    carvedilol  6.25 mg Oral BID    insulin glargine  15 Units SubCUTAneous QAM      Infusions:    dextrose       PRN Meds: ondansetron, 4 mg, Q30 Min PRN  polyethylene glycol, 17 g, Daily PRN  glucose, 4 tablet, PRN  dextrose bolus, 125 mL, PRN   Or  dextrose bolus, 250 mL, PRN  glucagon (rDNA), 1 mg, PRN  dextrose, , Continuous PRN        Labs      Recent Results (from the past 24 hour(s))   POCT Glucose    Collection Time: 08/24/22  9:22 AM   Result Value Ref Range    POC Glucose 159 (H) 70 - 99 MG/DL   POC Blood Glucose    Collection Time: 08/24/22  9:23 AM   Result Value Ref Range    Glucose 159 mg/dL   CBC with Auto Differential    Collection Time: 08/24/22  9:23 AM   Result Value Ref Range    WBC 5.1 4.0 - 10.5 K/CU MM    RBC 4.46 4.2 - 5.4 M/CU MM    Hemoglobin 14.4 12.5 - 16.0 GM/DL    Hematocrit 43.7 37 - 47 %    MCV 98.0 78 - 100 FL    MCH 32.3 (H) 27 - 31 PG    MCHC 33.0 32.0 - 36.0 %    RDW 13.3 11.7 - 14.9 %    Platelets 585 577 - 723 K/CU MM    MPV 11.0 7.5 - 11.1 FL    Differential Type AUTOMATED DIFFERENTIAL     Segs Relative 45.2 36 - 66 %    Lymphocytes % 44.7 (H) 24 - 44 %    Monocytes % 6.4 (H) 0 - 4 %    Eosinophils % 2.7 0 - 3 %    Basophils % 0.6 0 - 1 %    Segs Absolute 2.3 K/CU MM    Lymphocytes Absolute 2.3 K/CU MM    Monocytes Absolute 0.3 K/CU MM    Eosinophils Absolute 0.1 K/CU MM    Basophils Absolute 0.0 K/CU MM    Nucleated RBC % 0.0 %    Total Nucleated RBC 0.0 K/CU MM    Total Immature Neutrophil 0.02 K/CU MM    Immature Neutrophil % 0.4 0 - 0.43 %   Comprehensive Metabolic Panel    Collection Time: 08/24/22  9:23 AM   Result Value Ref Range    Sodium 136 135 - 145 MMOL/L    Potassium 4.2 3.5 - 5.1 MMOL/L    Chloride 101 99 - 110 mMol/L    CO2 25 21 - 32 MMOL/L    BUN 13 6 - 23 MG/DL    Creatinine 0.6 0.6 - 1.1 MG/DL    Glucose 179 (H) 70 - 99 MG/DL    Calcium 8.8 8.3 - 10.6 MG/DL    Albumin 3.3 (L) 3.4 - 5.0 GM/DL    Total Protein 6.0 (L) 6.4 - 8.2 GM/DL    Total Bilirubin 0.5 0.0 - 1.0 MG/DL    ALT 11 10 - 40 U/L    AST 19 15 - 37 IU/L    Alkaline Phosphatase 68 40 - 129 IU/L    GFR Non-African American >60 >60 mL/min/1.73m2    GFR African American >60 >60 mL/min/1.73m2    Anion Gap 10 4 - 16   Magnesium    Collection Time: 08/24/22  9:23 AM   Result Value Ref Range    Magnesium 1.7 (L) 1.8 - 2.4 mg/dl   Troponin    Collection Time: 08/24/22  9:23 AM   Result Value Ref Range    Troponin T <0.010 <0.01 NG/ML   Brain Natriuretic Peptide    Collection Time: 08/24/22  9:23 AM   Result Value Ref Range    Pro-.6 <300 PG/ML   CK    Collection Time: 08/24/22  9:23 AM   Result Value Ref Range    Total CK 63 26 - 140 IU/L   Lactic Acid    Collection Time: 08/24/22  9:23 AM   Result Value Ref Range    Lactate 1.8 0.4 - 2.0 mMOL/L   Protime/INR & PTT    Collection Time: 08/24/22  9:23 AM   Result Value Ref Range    Protime 12.7 11.7 - 14.5 SECONDS    INR 0.98 INDEX    aPTT 25.9 25.1 - 37.1 SECONDS   Lipase    Collection Time: 08/24/22  9:23 AM   Result Value Ref Range    Lipase 17 13 - 60 IU/L   Urinalysis    Collection Time: 08/24/22  9:48 AM   Result Value Ref Range    Color, UA YELLOW YELLOW    Clarity, UA CLEAR CLEAR    Glucose, Urine NEGATIVE NEGATIVE MG/DL    Bilirubin Urine NEGATIVE NEGATIVE MG/DL    Ketones, Urine NEGATIVE NEGATIVE MG/DL    Specific Gravity, UA <=1.005 1.001 - 1.035    Blood, Urine TRACE NEGATIVE    pH, Urine 5.5 5.0 - 8.0    Protein, UA NEGATIVE NEGATIVE MG/DL    Urobilinogen, Urine 0.2 0.2 - 1.0 MG/DL    Nitrite Urine, Quantitative NEGATIVE NEGATIVE    Leukocyte Esterase, Urine LARGE NEGATIVE    RBC, UA NONE SEEN 0 - 6 /HPF    WBC, UA 40 (H) 0 - 5 /HPF    Bacteria, UA RARE (A) NEGATIVE /HPF    Squam Epithel, UA 3 /HPF    Mucus, UA RARE (A) NEGATIVE HPF    Trichomonas, UA NONE SEEN NONE SEEN /HPF   EKG 12 Lead    Collection Time: 08/24/22  9:51 AM   Result Value Ref Range    Ventricular Rate 108 BPM    Atrial Rate 108 BPM    P-R Interval 160 ms    QRS Duration 78 ms    Q-T Interval 346 ms    QTc Calculation (Bazett) 463 ms    P Axis 52 degrees    R Axis -43 degrees    T Axis 35 degrees    Diagnosis       Sinus tachycardia  Left axis deviation  Inferior infarct (cited on or before 28-MAY-2018)  Cannot rule out Anterior infarct (cited on or before 28-MAY-2018)  Abnormal ECG  When compared with ECG of 13-MAY-2021 01:41,  Questionable change in initial forces of Anterolateral leads     POCT Glucose    Collection Time: 08/24/22  3:45 PM   Result Value Ref Range    POC Glucose 177 (H) 70 - 99 MG/DL   POCT Glucose    Collection Time: 08/24/22  5:15 PM   Result Value Ref Range    POC Glucose 189 (H) 70 - 99 MG/DL   TSH    Collection Time: 08/24/22  5:38 PM   Result Value Ref Range    TSH, High Sensitivity 0.986 0.270 - 4.20 uIu/ml   Troponin    Collection Time: 08/24/22  5:38 PM   Result Value Ref Range    Troponin T <0.010 <0.01 NG/ML   Troponin    Collection Time: 08/24/22  8:15 PM   Result Value Ref Range    Troponin T <0.010 <0.01 NG/ML   POCT Glucose    Collection Time: 08/24/22  9:18 PM   Result Value Ref Range    POC Glucose 170 (H) 70 - 99 MG/DL   POCT Glucose    Collection Time: 08/25/22  6:57 AM   Result Value Ref Range    POC Glucose 127 (H) 70 - 99 MG/DL        Imaging/Diagnostics Last 24 Hours   CT HEAD WO CONTRAST    Result Date: 8/24/2022  EXAMINATION: CT OF THE HEAD WITHOUT CONTRAST  8/24/2022 8:56 am TECHNIQUE: CT of the head was performed without the administration of intravenous contrast. Automated exposure control, iterative reconstruction, and/or weight based adjustment of the mA/kV was utilized to reduce the radiation dose to as low as reasonably achievable. COMPARISON: 05/14/2021 HISTORY: ORDERING SYSTEM PROVIDED HISTORY: HEAD TRAUMA, CLOSED, MILD, GCS >= 13, NO RISK FACTORS, NEURO EXAM NORMAL TECHNOLOGIST PROVIDED HISTORY: Has a \"code stroke\" or \"stroke alert\" been called? ->Yes Reason for exam:->L sided weakness Decision Support Exception - unselect if not a suspected or confirmed emergency medical condition->Emergency Medical Condition (MA) Reason for Exam: L sided weakness, Head trauma, closed, mild, GCS >= 13, no risk factors, neuro exam normal FINDINGS: Nondiagnostic evaluation secondary to significant motion degradation and patient positioning. No acute intracranial abnormality in the bilateral parietal and occipital regions. However, evaluation of remaining regions is nondiagnostic. Acute hemorrhage cannot be confidently excluded. Involutional parenchymal changes with microvascular ischemic disease. Chronic right cerebellar encephalomalacia/gliosis. Bilateral maxillary sinus retention cyst.  The mastoid air cells appear clear. Nondiagnostic evaluation secondary to significant motion degradation and patient positioning. Acute hemorrhage cannot be confidently excluded. Can consider repeat evaluation. Findings discussed with Dr. Tra Roa on 08/24/2022 at 9:28 a.m.     XR CHEST PORTABLE    Result Date: 8/24/2022  EXAMINATION: ONE X-RAY VIEW OF THE CHEST 8/24/2022 8:57 am COMPARISON: 05/13/2021. HISTORY: ORDERING SYSTEM PROVIDED HISTORY: L sided weakness TECHNOLOGIST PROVIDED HISTORY: Reason for exam:->L sided weakness Reason for Exam: L sided weakness Additional signs and symptoms: L sided weakness Relevant Medical/Surgical History: L sided weakness FINDINGS: Significantly limited exam secondary to patient's head overlying a large portion of the right hemithorax and small portion of the left lung apex. Within this limitation: Visualized lung fields appear clear. No pleural effusions are seen. No left pneumothorax is identified. No definite large pneumothorax seen on the right. Cardiac and mediastinal silhouettes are stable. To the extent that they can be assessed no acute bony abnormality is seen. Limited exam without acute abnormality identified.      CTA HEAD NECK W CONTRAST    Result Date: 8/24/2022  EXAMINATION: CTA OF THE HEAD AND NECK WITH CONTRAST 8/24/2022 9:12 am: TECHNIQUE: CTA of the head and neck was performed with the administration of intravenous contrast. Multiplanar reformatted images are provided for review. MIP images are provided for review. Stenosis of the internal carotid arteries measured using NASCET criteria. Automated exposure control, iterative reconstruction, and/or weight based adjustment of the mA/kV was utilized to reduce the radiation dose to as low as reasonably achievable. COMPARISON: No CTA comparison available. HISTORY: ORDERING SYSTEM PROVIDED HISTORY: L sided weakness TECHNOLOGIST PROVIDED HISTORY: Reason for exam:->L sided weakness Has a \"code stroke\" or \"stroke alert\" been called? ->Yes Decision Support Exception - unselect if not a suspected or confirmed emergency medical condition->Emergency Medical Condition (MA) FINDINGS: CTA NECK: Limited evaluation secondary to patient positioning and motion degradation. AORTIC ARCH/ARCH VESSELS: No dissection or arterial injury. No significant stenosis of the brachiocephalic or subclavian arteries. CAROTID ARTERIES: No dissection, arterial injury, or hemodynamically significant stenosis by NASCET criteria. Retropharyngeal course of the bilateral internal carotid arteries, anatomic variant. VERTEBRAL ARTERIES: The proximal vertebral arteries appear patent without significant stenosis. Evaluation of the remaining segments is nondiagnostic secondary to motion degradation. SOFT TISSUES: The lung apices are clear. No cervical or superior mediastinal lymphadenopathy. The larynx and pharynx are unremarkable. No acute abnormality of the salivary and thyroid glands. Questionable 1.5 cm thyroid isthmus nodule. BONES: Age-indeterminate T1 superior endplate compression fracture deformity versus degenerative Schmorl's node formation. Chronic T3 and T6 compression fracture deformities. Exaggerated kyphosis of the cervical spine. CTA HEAD: Nondiagnostic CTA head evaluation secondary to significant motion degradation. CTA neck: 1. The bilateral carotid arteries appear patent without significant stenosis.  2. Nondiagnostic evaluation of the bilateral vertebral arteries secondary to significant motion degradation and patient positioning. 3. Age-indeterminate T1 superior endplate compression fracture deformity versus degenerative Schmorl's node formation. Chronic T3 and T6 compression fracture deformities. 4. Questionable 1.5 cm thyroid isthmus nodule. Recommend correlation with non-emergent thyroid ultrasound. CTA head: 1. Nondiagnostic CTA head evaluation secondary to significant motion degradation. RECOMMENDATIONS: 1.5 cm incidental thyroid nodule. Recommend thyroid US. Reference: J Am Katarina Radiol.  2015 Feb;12(2): 143-50       Electronically signed by Nabil Virgen PA-C on 8/25/2022 at 7:48 AM

## 2022-08-25 NOTE — CONSULTS
[address], 1945, [id number], 8/25/2022    History  [place]:  The primary encounter diagnosis was Stroke-like symptoms. Diagnoses of Left-sided weakness and History of dementia were also pertinent to this visit. Patient   Patient  has a past surgical history that includes skin biopsy. Subjective:  Patient states:  \"I'd say some bad things, but I don't curse a lot\",  \"Of course it's good I'm brilliant\". Pain:  Pt c/o intermittent discomfort, does not rate or locate. Communication with other providers:  Handoff to RN, co-eval with OT, [address]  for safety. Discussed with RN post-session. Restrictions: High fall risk, L-sided weakness, severe cervical flexion posturing, tele, [place], exit alarm    Home Setup/Prior level of function  Social/Functional History  Lives With: Alone  Type of Home: Facility  Home Layout: One level  Receives Help From: Personal care attendant  ADL Assistance: Needs assistance  Toileting: Needs assistance  Ambulation Assistance: Non-ambulatory  Transfer Assistance: Needs assistance  Additional Comments: Per daughter pt has been at [facility] since 2018. Pt has received skilled therapy services \"on and off\". Pt benitaugter reports [facility] since [facility]. Examination of body systems (includes body structures/functions, activity/participation limitations):  Observation:  pt is awake in semi-fowlers with severe cervical flexion and L torticollis  upon arrival  Vision:  Impaired, mostly r/t severe c-spine positioning  Hearing:  [place]  Cardiopulmonary:  On RA, stable  Cognition: Impaired h/o dementia, see OT/SLP note for further evaluation. Musculoskeletal  ROM R/L:  WFL BLE. Strength R/L:  RLE 4-/5 LLE 3+/5, decreased in function and endurance.     Neuro:  H/o CVAs with residual deficits  (1997, 2001), cognitive deficits, recent L-sided deficits, mild decreased sensation L side. Gait pattern: Pt is non-AMB at baseline    Mobility:  Rolling L/R:  Mod x2  Supine to sit:  Max x2  Transfers: Dependent Naren Jordan)  Sitting balance:  Min->Mod A. Standing balance:  N/A. Gait: N/A    [facility] 6 Clicks Inpatient Mobility:  AM-PAC Inpatient Mobility Raw Score : 8    Treatment:    *Min increased time was taken at beginning of session to build report with both pt and daughter, discuss pt's PLOF, pt needs, daughter's concerns. Bed mobility: PT encourages sup>sit, provides v/c for sequencing. Pt demonstrates poor ability to advance LE, requires Max A x2 for LE/ hips to EOB and Max for trunk to upright. PT v/c for scooting to EOB, pt requires Max A. Sitting balance: Seated EOB pt demonstrates poor balance, significant C-spine flexion posturing, heavy L lateral lean UE support required for maintaining upright. PT assists with positioning, pt is able to place RUE on bed rail to support self, LUE placed with Mod A, however insufficient  strength to maintain. Seated balance EOB x5' for assessment of strength/balance. Return to supine with Max x2, pt is agreeable to [facility] to chair. Education was provided to daughter re: use of [facility], plan for setup of recliner and modifications to chair to accommodate pt posture. Answered daughter's questions as able. Rolling L/R x1 ea with Mod A x2 and decreased speed in order to address pt fear/concerns. Placement of Jonathan with appropriate layers provided. Pt was hoyered to padded recliner with x2 assist, pt required cues for reassurance d/t fear. Scooting in chair Max A x2. PT provided blanket rolls for positioning to correct L trunk lean. Increased time ensuring pt in best position achievable. Pt did not tolerate C-spine cushion. End of session pt left in recliner with Jonathan pad  with lines managed, call light, phone, exit alarm, tray, all needs, RN aware.       Assessment:    Pt is a 76 y/o female admitted 8/24 with c/o L-sided weakness. Patient with significant h/o VA (cerebral infarction), Diabetes mellitus ([address] History of cardiovascular stress test, Hx of echocardiogram, Hyperlipidemia, and Unspecified cerebral artery occlusion, see chart. Per pt report pt has been performing ADLs/IADLs with significant assist from SNF staff for all self care/mobility. At this time pt appears to be functioning near baseline. Pt is now presenting with impairments in BLE strength, functional endurance, safety awareness, balance, C-spine ROM and posture, transfers, mobility. Pt would benefit from skilled PT services in order to address impairments and promote return to PLOF. PT to recommend d/c to LTC with PT services. Pt is without acute PT needs, pt will sign off. Complexity: Moderate  Prognosis: Good, no significant barriers to participation at this time. Plan Plan: Discharge with evaluation only/week      Recommendations for NURSING mobility: Jonathan x2.  Bed mobility Max x2    Time:   Time in: 13:42  Time out: 14:33  Timed treatment minutes: 40  Total time: 50    Electronically signed by:    Esther Martinez PT  3/74/7770, 5:54 PM  PT Lic #: [id number]

## 2022-08-25 NOTE — PROGRESS NOTES
[facility] THERAPY EVALUATION    Kamlesh Medina, 1945, [id number], 8/25/2022    Discharge Recommendation: return to SNF, d/c IP OT    History:  [place]:  The primary encounter diagnosis was Stroke-like symptoms. Diagnoses of Left-sided weakness and History of dementia were also pertinent to this visit. Subjective:  Patient states: \"Of course it's good I'm brilliant\"  Pain: acknowledges intermittent discomfort, doesn't rate  Communication with other providers: cleared/handoff with nursing, coeval with PT [place]  Restrictions: general precautions, fall risk, severe cervical flexion  daughter at bedside. Very involved in pt care, cares deeply for pt    Home Setup/Prior level of function:  Social/Functional History  Lives With: Alone  Type of Home: Facility  Home Layout: One level  Receives Help From: Personal care attendant  ADL Assistance: Needs assistance  Toileting: Needs assistance  Ambulation Assistance: Non-ambulatory  Transfer Assistance: Needs assistance  Additional Comments: Per daughter pt has been at [facility] since 2018. Pt has received skilled therapy services \"on and off\". Pt jason reports [facility] pt has not [facility] since [facility]. Examination:  Observation: Pt was semi fowlers in bed upon arrival, agreeable to session. Pocket tele, cb  Vision: WFL  Hearing: slight [place]  Objective Measures: stable    Body Systems and functions:  ROM: WFL PROM BUE, AROM as follows: R shoulder flexion ~45 degrees, ~90 deg elbow flexion.  Minimal AROM of LUE, able to flex fingers somewhat  Strength: 3-/5 RUE, 2-/5 LUE  Sensation: pt states WFL  Tone: normotonic in BUE  Coordination: movements fluid and coordinated  Activity Tolerance: fair    Activities of Daily Living (ADLs):  Feeding: max A  Grooming: dep  Toileting: dep  UB dressing/bathing: dep  LB dressing/bathing: dep    *pt ADL function inferred from gross functional assessment of mobility, balance, posture, safety awareness, activity tolerance (unless otherwise indicated)    Cognitive and Psychosocial Functioning:  Overall cognitive status: Generally confused, somewhat disoriented. Pt mildly agitated throughout. Affect: flat    Functional Mobility:  Bed Mobility: max Ax2  Sit <> Stand: DNT- nonambulatory baseline    Ambulation: DNT- nonambulatory baseline      AM-PAC 6 click short form for inpatient daily activity:   How much help from another person does the patient currently need. .. Unable  Dep A Lot  Max A A Lot   Mod A A Little  Min A A Little   CGA  SBA None   Mod I  Indep  Sup   1. Putting on and taking off regular lower body clothing? [x] 1    [] 2   [] 2   [] 3   [] 3   [] 4      2. Bathing (including washing, rinsing, drying)? [x] 1   [] 2   [] 2 [] 3 [] 3 [] 4   3. Toileting, which includes using toilet, bedpan, or urinal? [x] 1    [] 2   [] 2   [] 3   [] 3   [] 4     4. Putting on and taking off regular upper body clothing? [x] 1   [] 2   [] 2   [] 3   [] 3    [] 4      5. Taking care of personal grooming such as brushing teeth? [x] 1   [] 2    [] 2 [] 3    [] 3   [] 4      6. Eating meals? [] 1   [x] 2   [] 2   [] 3   [] 3   [] 4        Raw Score:  7      24/24 = unimpaired  23/24 = 1-20% impaired   20/24-22/24 = 21-40% impaired  15/24-19/24 = 41-59% impaired   10/24-14/24 = 60%-79% impaired  7/24-9/24 = 80%-99% impaired  6/24 = 100% impaired     Treatment:    Therapeutic Activity Training (40 minutes): Therapeutic activity training was instructed today. Cues were given for safety, sequence, UE/LE placement, visual cues, and balance. Activities performed today included pt demo supine to sit EOB with max Ax2, pt attempting to move BLE but unable. Pt at EOB, initially requiring min A for balance but eventually progressing to mod A with fatigue, strong L lean throughout. Pt able to tolerate EOB sitting ~15 minutes.  Pt returned to supine with max Ax2, scooting to [facility] with max Ax2. Jonathan sling and additional linens retrieved. Pt rolling x2 to each side to place jonathan pad mod Ax2. Pt then hoyered to chair dependently x2. Pt with extensive positioning needs. Pt content with positioning, states she needs to use restroom. Pt edu on cb, therapy POC, discharge disposition. Handoff to nursing about pt positioning and possible toileting needs. Education: Role of OT, OT POC, discharge needs, safety, benefits of EOB/OOB activity, AD/DME needs, Home safety  Safety Measures: Gait belt used for safety of pt and therapist, Left in recliner, call light and phone within reach, lines managed    Assessment:  Pt is a 77 yr old female from LTC who presents with left-sided weakness, hx multiple CVA and comorbid conditions. Prior to admission, pt was requiring assistance with all ADLs, IADLs, and mobility, nonambulatory for several years. . Pt currently close to baseline, maxA-dep for self cares, nonambulatory, mod-max Ax2 for bed mobility. At this time, pt and therapist agree that pt has no needs/goals for IP OT services and would be safe to to LTC. Thank you for the opportunity to work with this patient, please reorder services if needs arise.     Complexity: mod  Prognosis: good  Barriers: chronic conditions/debility    Time:   Time in: 1342  Time out: 1433  Treatment Minutes: 40  Evaluation Minutes: 10  Total time: 51    Electronically signed by:      YUSRA Martinez  8/25/2022, 3:13 PM

## 2022-08-25 NOTE — CARE COORDINATION
Reviewed chart and discussed in IDR , pt is from [facility], not sure if the SNU or LTC. VM left for Sharlon at [facility].

## 2022-08-26 ENCOUNTER — APPOINTMENT (OUTPATIENT)
Dept: CT IMAGING | Age: 77
End: 2022-08-26
Payer: COMMERCIAL

## 2022-08-26 VITALS
RESPIRATION RATE: 16 BRPM | HEIGHT: 59 IN | HEART RATE: 98 BPM | WEIGHT: 118 LBS | DIASTOLIC BLOOD PRESSURE: 87 MMHG | OXYGEN SATURATION: 96 % | SYSTOLIC BLOOD PRESSURE: 153 MMHG | BODY MASS INDEX: 23.79 KG/M2 | TEMPERATURE: 98.6 F

## 2022-08-26 LAB
GLUCOSE BLD-MCNC: 136 MG/DL (ref 70–99)
GLUCOSE BLD-MCNC: 152 MG/DL (ref 70–99)
SARS-COV-2, NAAT: NOT DETECTED
SOURCE: NORMAL

## 2022-08-26 PROCEDURE — 2580000003 HC RX 258: Performed by: NURSE PRACTITIONER

## 2022-08-26 PROCEDURE — 87635 SARS-COV-2 COVID-19 AMP PRB: CPT

## 2022-08-26 PROCEDURE — 82962 GLUCOSE BLOOD TEST: CPT

## 2022-08-26 PROCEDURE — 94761 N-INVAS EAR/PLS OXIMETRY MLT: CPT

## 2022-08-26 PROCEDURE — 96366 THER/PROPH/DIAG IV INF ADDON: CPT

## 2022-08-26 PROCEDURE — G0378 HOSPITAL OBSERVATION PER HR: HCPCS

## 2022-08-26 PROCEDURE — 6370000000 HC RX 637 (ALT 250 FOR IP): Performed by: PHYSICIAN ASSISTANT

## 2022-08-26 PROCEDURE — 6370000000 HC RX 637 (ALT 250 FOR IP): Performed by: NURSE PRACTITIONER

## 2022-08-26 PROCEDURE — 87086 URINE CULTURE/COLONY COUNT: CPT

## 2022-08-26 PROCEDURE — 6360000002 HC RX W HCPCS: Performed by: NURSE PRACTITIONER

## 2022-08-26 RX ORDER — SIMVASTATIN 10 MG
10 TABLET ORAL NIGHTLY
Qty: 30 TABLET | Refills: 0
Start: 2022-08-26

## 2022-08-26 RX ORDER — INSULIN GLARGINE 100 [IU]/ML
15 INJECTION, SOLUTION SUBCUTANEOUS EVERY MORNING
Qty: 5 PEN | Refills: 3
Start: 2022-08-26

## 2022-08-26 RX ORDER — ASPIRIN AND DIPYRIDAMOLE 25; 200 MG/1; MG/1
1 CAPSULE, EXTENDED RELEASE ORAL 2 TIMES DAILY
Qty: 60 CAPSULE | Refills: 0
Start: 2022-08-26

## 2022-08-26 RX ADMIN — CARVEDILOL 6.25 MG: 6.25 TABLET, FILM COATED ORAL at 09:43

## 2022-08-26 RX ADMIN — ATORVASTATIN CALCIUM 10 MG: 10 TABLET, FILM COATED ORAL at 09:43

## 2022-08-26 RX ADMIN — INSULIN GLARGINE 10 UNITS: 100 INJECTION, SOLUTION SUBCUTANEOUS at 09:43

## 2022-08-26 RX ADMIN — ASPIRIN AND EXTENDED-RELEASE DIPYRIDAMOLE 1 CAPSULE: 25; 200 CAPSULE ORAL at 09:43

## 2022-08-26 RX ADMIN — CEFTRIAXONE SODIUM 1000 MG: 1 INJECTION, POWDER, FOR SOLUTION INTRAMUSCULAR; INTRAVENOUS at 12:32

## 2022-08-26 NOTE — PROGRESS NOTES
Report was given to [facility] staff, dinah arrived to transport patient to [facility].

## 2022-08-26 NOTE — PROGRESS NOTES
Unable to obtain imaging due to patient motion. Patient unable to follow commands. Safety straps around patient and she was puling them off. Unsafe to scan patient. Perfect served ordering and let them know.

## 2022-08-26 NOTE — DISCHARGE INSTR - COC
Continuity of Care Form    Patient Name: Adolfo Vigil   :  1945  MRN:  [id number]    Admit date:  2022  Discharge date:  2022    Code Status Order: Full Code   Advance Directives:     Admitting Physician:  No admitting provider for patient encounter. PCP: Bibiana Cole MD    Discharging Nurse: LINCOLN Valle [address] Unit/Room#: 2994/3722-F  Discharging Unit Phone Number: [phone]    Emergency Contact:   Extended Emergency Contact Information  Primary Emergency Contact: Valerie Culverwayne  Address:  [address] Phone: [phone]  Relation: Child  Secondary Emergency Contact: RASHAD JAY  [place] Phone: [phone]  Relation: Child    Past Surgical History:  Past Surgical History:   Procedure Laterality Date    SKIN BIOPSY         Immunization History:   Immunization History   Administered Date(s) Administered    COVID-19, PFIZER PURPLE top, DILUTE for use, (age 15 y+), 30mcg/0.3mL 2021, 2021, 2022       Active Problems:  Patient Active Problem List   Diagnosis Code    Diabetes mellitus ([facility] E11.9    Hyperlipidemia E78.5    Cerebral infarction ([facility] [address] [facility] I63.9    Abnormal EKG R94.31    History of cardiovascular stress test Z92.89    History of cardiovascular stress test Z92.89    Acute respiratory failure ([facility] [address] J96.00    Hypoglycemia E16.2    Tachycardia R00.0    Urinary tract infection without hematuria U25.2    Metabolic encephalopathy J07.62    Left-sided weakness R53.1       Isolation/Infection:   Isolation            No Isolation          Patient Infection Status       Infection Onset Added Last Indicated Last Indicated By Review Planned Expiration Resolved Resolved By    None active    Resolved    COVID-19 (Rule Out) 21 COVID-19, Rapid (Ordered)   21 Rule-Out Test Resulted    COVID-19 (Rule Out) 20 Covid-19 Ambulatory (Ordered)   20 Rule-Out Test Resulted    MRSA 12/12/18 12/15/18 03/12/19 Urine Culture   05/14/21 Juan Carlos White LPN    9/21/53 urine; 12/12/18 wound            Nurse Assessment:  Last Vital Signs: BP (!) 145/76   Pulse 97   Temp 98.4 °F (36.9 °C) (Oral)   Resp 18   Ht 4' 11\" (1.499 m)   Wt 118 lb (53.5 kg)   SpO2 95%   BMI 23.83 kg/m²     Last documented pain score (0-10 scale):    Last Weight:   Wt Readings from Last 1 Encounters:   08/24/22 118 lb (53.5 kg)     Mental Status:  disoriented and alert    IV Access:  - None    Nursing Mobility/ADLs:  Walking   Dependent  Transfer  Dependent  Bathing  Dependent  Dressing  Dependent  Toileting  Dependent  Feeding  Dependent  Med Admin  Dependent  Med Delivery   crushed and prefers mixed with applesauce or pudding    Wound Care Documentation and Therapy:        Elimination:  Continence: Bowel: Yes  Bladder: used external catheter  Urinary Catheter: None   Colostomy/Ileostomy/Ileal Conduit: No       Date of Last BM: 8/25/2022    Intake/Output Summary (Last 24 hours) at 8/26/2022 1216  Last data filed at 8/26/2022 0608  Gross per 24 hour   Intake --   Output 500 ml   Net -500 ml     I/O last 3 completed shifts:  In: -   Out: 500 [Urine:500]    Safety Concerns: At Risk for Falls and Aspiration Risk    Impairments/Disabilities:      Contractures - left arm/hand    Nutrition Therapy:  Current Nutrition Therapy:   - Oral Diet:  Dysphagia 1 pureed    Routes of Feeding: Oral  Liquids: Thin Liquids  Daily Fluid Restriction: no  Last Modified Barium Swallow with Video (Video Swallowing Test): not done    Treatments at the Time of Hospital Discharge:   Respiratory Treatments: none  Oxygen Therapy:  is not on home oxygen therapy.   Ventilator:    - No ventilator support    Rehab Therapies: Physical Therapy and Occupational Therapy  Weight Bearing Status/Restrictions: No weight bearing restrictions  Other Medical Equipment (for information only, NOT a DME order):  walker  Other Treatments: Patient's personal belongings (please select all that are sent with patient):  None    RN SIGNATURE:  Electronically signed by Pedro Johnson LPN on 9/20/82 at 8:58 PM EDT    CASE MANAGEMENT/SOCIAL WORK SECTION    Inpatient Status Date: ***    Readmission Risk Assessment Score:  Readmission Risk              Risk of Unplanned Readmission:  0           Discharging to Facility/ Agency   Name:   Address:  Phone:  Fax:    Dialysis Facility (if applicable)   Name:  Address:  Dialysis Schedule:  Phone:  Fax:    / signature: {Esignature:[id number]}    PHYSICIAN SECTION    Prognosis: Fair    Condition at Discharge: Stable    Rehab Potential (if transferring to Rehab): Fair    Recommended Labs or Other Treatments After Discharge:     POCT blood glucose qACHS, titrate insulin as needed    Recheck BMP and mag in one week     Patient's CT showed a thyroid nodule. Please arrange for outpatient thyroid US    Arrange for follow-up with patients neurologist     Physician Certification: I certify the above information and transfer of Dorota Louis  is necessary for the continuing treatment of the diagnosis listed and that she requires [facility] for greater 30 days.      Update Admission H&P: No change in H&P    PHYSICIAN SIGNATURE:  Electronically signed by Aidee Brennan PA-C on 8/26/22 at 12:20 PM EDT8

## 2022-08-26 NOTE — DISCHARGE SUMMARY
V2.0  Discharge Summary    Name:  Esther Caballero /Age/Sex: 1945 (47 y.o. female)   Admit Date: 2022  Discharge Date: 22    MRN & CSN:  [id number] & [id number] Encounter Date and Time 22 12:43 PM EDT    Attending:  Tj Vázquez MD Discharging Provider: Shaina Causey [facility] Course:     Brief HPI: Esther Caballero is a 77 y.o. female who presented with stroke-like symptoms.     Problems addressed during this hospitalization:     Stroke-like symptoms   - presented with reported left-sided weakness, slurred speech, facial droop, worsening from baseline   - CT head no acute process, but poor study due to motion artifact  - CTA head and neck no acute process -degraded due to motion artifact  -PT/OT evaluated, recommended to discharge back to long-term care  -SLP evaluated, downgraded patient's diet to dysphagia puréed with thin liquids  - neurology evaluated - recommended to continue anti-platelet, statin, no further work-up from their perspective   - attempted to repeat CT head however patient too agitated   - symptoms improving, possible recrudescence from prior CVA given UTI   -Continue Aggrenox, statin  - continue outpatient follow-up      UTI   - UA infectious   - afebrile, no leukocytosis   - difficult to assess symptoms given dementia, but givne above, will treat  -Received Rocephin x3 doses, urine culture pending on discharge, will follow     Hypomagnesemia   - resolved with replacement     Hypertension   - BP stable   - continue home coreg      Insulin-dependent T2DM  - reduced lantus to 10units on discharge given BG stable on reduced dosing and downgrade in diet     Dementia   - alert and oriented x2 today, appears near baseline     Dysphagia  -In setting of dementia and multiple CVAs  -SLP evaluated, downgraded patient to puréed solids/thin liquids with aspiration precautions  -Daughter states patient swallowing has progressively been declining  -Continue to monitor swallowing function at [facility]     Thyroid nodule   - outpatient thyroid US     Cervical dystonia     This patient was discussed with Dr. Radha Garza. He was agreeable with patient's plan at discharge as dictated above. The patient expressed appropriate understanding of, and agreement with the discharge recommendations, medications, and plan.      Consults this admission:  IP CONSULT TO STROKE TEAM  IP CONSULT TO HOSPITALIST  IP CONSULT TO NEUROLOGY    Discharge Diagnosis:   Left-sided weakness    Discharge Instruction:   Follow up appointments: PCP, neurology   Primary care physician: Lupe Cole MD within 2 weeks  Diet: regular diet   Activity: activity as tolerated  Disposition: Discharged to:   []Home, [][facility], [x]SNF, []Acute Rehab, []Hospice   Condition on discharge: Stable  Labs and Tests to be Followed up as an outpatient by PCP or Specialist:     Discharge Medications:        Medication List        CONTINUE taking these medications      aspirin-dipyridamole  MG per extended release capsule  Commonly known as: Aggrenox  Take 1 capsule by mouth 2 times daily     Basaglar KwikPen 100 UNIT/ML injection pen  Generic drug: insulin glargine  Inject 15 Units into the skin every morning     carvedilol 6.25 MG tablet  Commonly known as: COREG  Take 1 tablet by mouth 2 times daily     potassium chloride 10 MEQ extended release capsule  Commonly known as: MICRO-K     simvastatin 10 MG tablet  Commonly known as: ZOCOR  Take 1 tablet by mouth nightly     therapeutic multivitamin-minerals tablet               Where to Get Your Medications        Information about where to get these medications is not yet available    Ask your nurse or doctor about these medications  aspirin-dipyridamole  MG per extended release capsule  Basaglar KwikPen 100 UNIT/ML injection pen  simvastatin 10 MG tablet        Objective Findings at Discharge:   BP (!) 141/78   Pulse 83   Temp 98.4 °F (36.9 °C) (Oral)   Resp 16   Ht 4' 11\" Study appears negative. Left ventricular systolic function is normal.  Ejection fraction is visually estimated at 55-60%. Grade I diastolic dysfunction. Proximal septal thickening. No regional wall motion abnormalites. Left ventricle size is normal.  Sclerotic, but non-stenotic aortic valve. Mild to moderate aortic regurgitation; PHT: 582 msec. Aortic valve appears tricuspid. No evidence of any pericardial effusion. Signature   ------------------------------------------------------------------  Electronically signed by Ava Manuel MD  (Interpreting physician) on 08/25/2022 at 12:12 PM  ------------------------------------------------------------------   Findings   Left Ventricle  Left ventricular systolic function is normal.  Ejection fraction is visually estimated at 55-60%. Grade I diastolic dysfunction. Proximal septal thickening. No regional wall motion abnormalites. Left ventricle size is normal.   Left Atrium  Essentially normal left atrium. Right Atrium  Essentially normal right atrium. Right Ventricle  Essentially normal right ventricle. Aortic Valve  Sclerotic, but non-stenotic aortic valve. Mild to moderate aortic regurgitation; PHT: 582 msec. Aortic valve appears tricuspid. Mitral Valve  Mitral annular calcification is present. Trace mitral regurgitation. Tricuspid Valve  Trace tricuspid regurgitation; normal RVSP. Pulmonic Valve  Trace pulmonic regurgitation present. Pericardial Effusion  No evidence of any pericardial effusion. Pleural Effusion  No evidence of pleural effusion. Miscellaneous  IVC and abdominal aorta are within normal limits.   M-Mode/2D Measurements & Calculations   LV Diastolic Dimension:  LV Systolic Dimension:  LA Dimension: 2.6 cmAO Root  3.86 cm                  1.99 cm                 Dimension: 3.3 cmLA Area:  LV FS:48.5 %             LV Volume Diastolic: 37 83.9 cm2  LV PW Diastolic: 9.20 cm ml  LV PW Systolic: 1.3 cm   LV Volume Systolic: 15  Septum Diastolic: 0.9 cm ml  Septum Systolic: 6.04 cm LV EDV/LV EDV Index: 37  CO: 5.06 l/min           ml/25 m2LV ESV/LV ESV   LA/Aorta: 0.79  CI: 3.44 l/m*m2          Index: 15 ml/10 m2      Ascending Aorta: 3.2 cm                           EF Calculated (A4C):    LA volume/Index: 39 ml  LV Area Diastolic: 12.0  71.4 %                  /27m2  cm2                      EF Calculated (2D):  LV Area Systolic: 0.55   22.6 %  cm2                           LV Length: 5.6 cm                            LVOT: 2 cm  Doppler Measurements & Calculations   MV Peak E-Wave: 74   AV Peak Velocity: 129 cm/s    LVOT Peak Velocity: 96.3  cm/s                 AV Peak Gradient: 6.66 mmHg   cm/s  MV Peak A-Wave: 111  AV Mean Velocity: 96.4 cm/s   LVOT Mean Velocity: 70.9  cm/s                 AV Mean Gradient: 4 mmHg      cm/s  MV E/A Ratio: 0.67   AV VTI: 32.5 cm               LVOT Peak Gradient: 4  MV Peak Gradient:    AV Area (Continuity):2.55 cm2 mmHgLVOT Mean Gradient: 2  2.19 mmHg                                          mmHg                       LVOT VTI: 26.4 cm             Estimated RVSP: 17 mmHg  MV P1/2t: 86 msec    AV P1/2t: 1108 msec           Estimated RAP:3 mmHg  MVA by PHT:2.56 cm2  Estimated PASP: 17.44 mmHg   MV E' Septal                                       TR Velocity:190 cm/s  Velocity: 4.61 cm/s                                TR Gradient:14.44 mmHg  MV E' Lateral                                      PV Peak Velocity: 76 cm/s  Velocity: 4.93 cm/s                                PV Peak Gradient: 2.31  MV E/E' septal:                                    mmHg  16.05  MV E/E' lateral:  15.01                                              MA ED Velocity: 88.2 cm/s      CT HEAD WO CONTRAST    Result Date: 8/24/2022  EXAMINATION: CT OF THE HEAD WITHOUT CONTRAST  8/24/2022 8:56 am TECHNIQUE: CT of the head was performed without the administration of intravenous contrast. Automated exposure control, iterative reconstruction, and/or weight based adjustment of the mA/kV was utilized to reduce the radiation dose to as low as reasonably achievable. COMPARISON: 05/14/2021 HISTORY: ORDERING SYSTEM PROVIDED HISTORY: HEAD TRAUMA, CLOSED, MILD, GCS >= 13, NO RISK FACTORS, NEURO EXAM NORMAL TECHNOLOGIST PROVIDED HISTORY: Has a \"code stroke\" or \"stroke alert\" been called? ->Yes Reason for exam:->L sided weakness Decision Support Exception - unselect if not a suspected or confirmed emergency medical condition->Emergency Medical Condition (MA) Reason for Exam: L sided weakness, Head trauma, closed, mild, GCS >= 13, no risk factors, neuro exam normal FINDINGS: Nondiagnostic evaluation secondary to significant motion degradation and patient positioning. No acute intracranial abnormality in the bilateral parietal and occipital regions. However, evaluation of remaining regions is nondiagnostic. Acute hemorrhage cannot be confidently excluded. Involutional parenchymal changes with microvascular ischemic disease. Chronic right cerebellar encephalomalacia/gliosis. Bilateral maxillary sinus retention cyst.  The mastoid air cells appear clear. Nondiagnostic evaluation secondary to significant motion degradation and patient positioning. Acute hemorrhage cannot be confidently excluded. Can consider repeat evaluation. Findings discussed with Dr. Ishaan Pompa on 08/24/2022 at 9:28 a.m.     XR CHEST PORTABLE    Result Date: 8/24/2022  EXAMINATION: ONE X-RAY VIEW OF THE CHEST 8/24/2022 8:57 am COMPARISON: 05/13/2021. HISTORY: ORDERING SYSTEM PROVIDED HISTORY: L sided weakness TECHNOLOGIST PROVIDED HISTORY: Reason for exam:->L sided weakness Reason for Exam: L sided weakness Additional signs and symptoms: L sided weakness Relevant Medical/Surgical History: L sided weakness FINDINGS: Significantly limited exam secondary to patient's head overlying a large portion of the right hemithorax and small portion of the left lung apex.  Within this limitation: Visualized lung fields appear clear. No pleural effusions are seen. No left pneumothorax is identified. No definite large pneumothorax seen on the right. Cardiac and mediastinal silhouettes are stable. To the extent that they can be assessed no acute bony abnormality is seen. Limited exam without acute abnormality identified. CTA HEAD NECK W CONTRAST    Result Date: 8/24/2022  EXAMINATION: CTA OF THE HEAD AND NECK WITH CONTRAST 8/24/2022 9:12 am: TECHNIQUE: CTA of the head and neck was performed with the administration of intravenous contrast. Multiplanar reformatted images are provided for review. MIP images are provided for review. Stenosis of the internal carotid arteries measured using NASCET criteria. Automated exposure control, iterative reconstruction, and/or weight based adjustment of the mA/kV was utilized to reduce the radiation dose to as low as reasonably achievable. COMPARISON: No CTA comparison available. HISTORY: ORDERING SYSTEM PROVIDED HISTORY: L sided weakness TECHNOLOGIST PROVIDED HISTORY: Reason for exam:->L sided weakness Has a \"code stroke\" or \"stroke alert\" been called? ->Yes Decision Support Exception - unselect if not a suspected or confirmed emergency medical condition->Emergency Medical Condition (MA) FINDINGS: CTA NECK: Limited evaluation secondary to patient positioning and motion degradation. AORTIC ARCH/ARCH VESSELS: No dissection or arterial injury. No significant stenosis of the brachiocephalic or subclavian arteries. CAROTID ARTERIES: No dissection, arterial injury, or hemodynamically significant stenosis by NASCET criteria. Retropharyngeal course of the bilateral internal carotid arteries, anatomic variant. VERTEBRAL ARTERIES: The proximal vertebral arteries appear patent without significant stenosis. Evaluation of the remaining segments is nondiagnostic secondary to motion degradation. SOFT TISSUES: The lung apices are clear.   No cervical or superior mediastinal lymphadenopathy. The larynx and pharynx are unremarkable. No acute abnormality of the salivary and thyroid glands. Questionable 1.5 cm thyroid isthmus nodule. BONES: Age-indeterminate T1 superior endplate compression fracture deformity versus degenerative Schmorl's node formation. Chronic T3 and T6 compression fracture deformities. Exaggerated kyphosis of the cervical spine. CTA HEAD: Nondiagnostic CTA head evaluation secondary to significant motion degradation. CTA neck: 1. The bilateral carotid arteries appear patent without significant stenosis. 2. Nondiagnostic evaluation of the bilateral vertebral arteries secondary to significant motion degradation and patient positioning. 3. Age-indeterminate T1 superior endplate compression fracture deformity versus degenerative Schmorl's node formation. Chronic T3 and T6 compression fracture deformities. 4. Questionable 1.5 cm thyroid isthmus nodule. Recommend correlation with non-emergent thyroid ultrasound. CTA head: 1. Nondiagnostic CTA head evaluation secondary to significant motion degradation. RECOMMENDATIONS: 1.5 cm incidental thyroid nodule. Recommend thyroid US. Reference: J Am Katarina Radiol.  2015 Feb;12(2): 143-50       CBC:   Recent Labs     08/24/22  0923 08/25/22  1319   WBC 5.1 5.4   HGB 14.4 15.1    194     BMP:    Recent Labs     08/24/22  0923 08/25/22  1319    141   K 4.2 4.3    104   CO2 25 26   BUN 13 13   CREATININE 0.6 0.6   GLUCOSE 179*  159 197*     Hepatic:   Recent Labs     08/24/22  0923   AST 19   ALT 11   BILITOT 0.5   ALKPHOS 68     Lipids:   Lab Results   Component Value Date/Time    CHOL 186 08/25/2022 01:19 PM    HDL 56 08/25/2022 01:19 PM    TRIG 79 08/25/2022 01:19 PM     Hemoglobin A1C:   Lab Results   Component Value Date/Time    LABA1C 7.7 08/25/2022 01:19 PM     TSH: No results found for: TSH  Troponin:   Lab Results   Component Value Date/Time    TROPONINT <0.010 08/24/2022 08:15 PM TROPONINT <0.010 08/24/2022 05:38 PM    TROPONINT <0.010 08/24/2022 09:23 AM     Lactic Acid: No results for input(s): LACTA in the last 72 hours.   BNP:   Recent Labs     08/24/22  0923   PROBNP 142.6     UA:  Lab Results   Component Value Date/Time    NITRU NEGATIVE 08/24/2022 09:48 AM    COLORU YELLOW 08/24/2022 09:48 AM    WBCUA 40 08/24/2022 09:48 AM    RBCUA NONE SEEN 08/24/2022 09:48 AM    MUCUS RARE 08/24/2022 09:48 AM    TRICHOMONAS NONE SEEN 08/24/2022 09:48 AM    YEAST MANY 03/12/2019 04:00 PM    BACTERIA RARE 08/24/2022 09:48 AM    CLARITYU CLEAR 08/24/2022 09:48 AM    SPECGRAV <=1.005 08/24/2022 09:48 AM    LEUKOCYTESUR LARGE 08/24/2022 09:48 AM    UROBILINOGEN 0.2 08/24/2022 09:48 AM    BILIRUBINUR NEGATIVE 08/24/2022 09:48 AM    BLOODU TRACE 08/24/2022 09:48 AM    KETUA NEGATIVE 08/24/2022 09:48 AM     Urine Cultures: No results found for: Anthony Ayon  Blood Cultures: No results found for: BC  No results found for: BLOODCULT2  Organism:   Lab Results   Component Value Date/Time    ORG MRSA 12/12/2018 03:00 PM       Time Spent Discharging patient 35 minutes    Electronically signed by Mona Zapata PA-C on 8/26/2022 at 12:57 PM

## 2022-08-28 LAB
CULTURE: NORMAL
Lab: NORMAL
SPECIMEN: NORMAL

## 2022-09-06 ENCOUNTER — HOSPITAL ENCOUNTER (OUTPATIENT)
Age: 77
Setting detail: SPECIMEN
Discharge: HOME | End: 2022-09-06
Payer: COMMERCIAL

## 2022-09-06 LAB
ALBUMIN SERPL-MCNC: 3.4 GM/DL (ref 3.4–5)
ALP BLD-CCNC: 67 IU/L (ref 40–128)
ALT SERPL-CCNC: 8 U/L (ref 10–40)
ANION GAP SERPL CALCULATED.3IONS-SCNC: 8 MMOL/L (ref 4–16)
AST SERPL-CCNC: 10 IU/L (ref 15–37)
BILIRUB SERPL-MCNC: 0.4 MG/DL (ref 0–1)
BUN BLDV-MCNC: 15 MG/DL (ref 6–23)
CALCIUM SERPL-MCNC: 8.8 MG/DL (ref 8.3–10.6)
CHLORIDE BLD-SCNC: 106 MMOL/L (ref 99–110)
CO2: 28 MMOL/L (ref 21–32)
CREAT SERPL-MCNC: 0.7 MG/DL (ref 0.6–1.1)
ESTIMATED AVERAGE GLUCOSE: 180 MG/DL
GFR AFRICAN AMERICAN: >60 ML/MIN/1.73M2
GFR NON-AFRICAN AMERICAN: >60 ML/MIN/1.73M2
GLUCOSE BLD-MCNC: 149 MG/DL (ref 70–99)
HBA1C MFR BLD: 7.9 % (ref 4.2–6.3)
HCT VFR BLD CALC: 43.7 % (ref 37–47)
HEMOGLOBIN: 13.8 GM/DL (ref 12.5–16)
MCH RBC QN AUTO: 32.3 PG (ref 27–31)
MCHC RBC AUTO-ENTMCNC: 31.6 % (ref 32–36)
MCV RBC AUTO: 102.3 FL (ref 78–100)
PDW BLD-RTO: 13.4 % (ref 11.7–14.9)
PLATELET # BLD: 204 K/CU MM (ref 140–440)
PMV BLD AUTO: 11.3 FL (ref 7.5–11.1)
POTASSIUM SERPL-SCNC: 3.8 MMOL/L (ref 3.5–5.1)
RBC # BLD: 4.27 M/CU MM (ref 4.2–5.4)
SODIUM BLD-SCNC: 142 MMOL/L (ref 135–145)
TOTAL PROTEIN: 5.7 GM/DL (ref 6.4–8.2)
WBC # BLD: 5.9 K/CU MM (ref 4–10.5)

## 2022-09-06 PROCEDURE — 85027 COMPLETE CBC AUTOMATED: CPT

## 2022-09-06 PROCEDURE — 83036 HEMOGLOBIN GLYCOSYLATED A1C: CPT

## 2022-09-06 PROCEDURE — 80053 COMPREHEN METABOLIC PANEL: CPT

## 2022-09-06 PROCEDURE — 36415 COLL VENOUS BLD VENIPUNCTURE: CPT

## 2023-02-07 ENCOUNTER — HOSPITAL ENCOUNTER (OUTPATIENT)
Age: 78
Setting detail: SPECIMEN
Discharge: HOME | End: 2023-02-07
Payer: COMMERCIAL

## 2023-02-07 LAB
ALBUMIN SERPL-MCNC: 3.1 GM/DL (ref 3.4–5)
ALP BLD-CCNC: 60 IU/L (ref 40–128)
ALT SERPL-CCNC: 7 U/L (ref 10–40)
ANION GAP SERPL CALCULATED.3IONS-SCNC: 11 MMOL/L (ref 4–16)
AST SERPL-CCNC: 10 IU/L (ref 15–37)
BILIRUB SERPL-MCNC: 0.2 MG/DL (ref 0–1)
BUN SERPL-MCNC: 15 MG/DL (ref 6–23)
CALCIUM SERPL-MCNC: 8.8 MG/DL (ref 8.3–10.6)
CHLORIDE BLD-SCNC: 107 MMOL/L (ref 99–110)
CO2: 27 MMOL/L (ref 21–32)
CREAT SERPL-MCNC: 0.9 MG/DL (ref 0.6–1.1)
ESTIMATED AVERAGE GLUCOSE: 177 MG/DL
GFR SERPL CREATININE-BSD FRML MDRD: >60 ML/MIN/1.73M2
GLUCOSE SERPL-MCNC: 279 MG/DL (ref 70–99)
HBA1C MFR BLD: 7.8 % (ref 4.2–6.3)
HCT VFR BLD CALC: 42.3 % (ref 37–47)
HEMOGLOBIN: 13.2 GM/DL (ref 12.5–16)
MCH RBC QN AUTO: 32 PG (ref 27–31)
MCHC RBC AUTO-ENTMCNC: 31.2 % (ref 32–36)
MCV RBC AUTO: 102.7 FL (ref 78–100)
PDW BLD-RTO: 12.6 % (ref 11.7–14.9)
PLATELET # BLD: 266 K/CU MM (ref 140–440)
PMV BLD AUTO: 10.8 FL (ref 7.5–11.1)
POTASSIUM SERPL-SCNC: 4.4 MMOL/L (ref 3.5–5.1)
RBC # BLD: 4.12 M/CU MM (ref 4.2–5.4)
SODIUM BLD-SCNC: 145 MMOL/L (ref 135–145)
TOTAL PROTEIN: 5.3 GM/DL (ref 6.4–8.2)
WBC # BLD: 5.6 K/CU MM (ref 4–10.5)

## 2023-02-07 PROCEDURE — 85027 COMPLETE CBC AUTOMATED: CPT

## 2023-02-07 PROCEDURE — 83036 HEMOGLOBIN GLYCOSYLATED A1C: CPT

## 2023-02-07 PROCEDURE — 36415 COLL VENOUS BLD VENIPUNCTURE: CPT

## 2023-02-07 PROCEDURE — 80053 COMPREHEN METABOLIC PANEL: CPT

## 2023-05-02 ENCOUNTER — HOSPITAL ENCOUNTER (OUTPATIENT)
Age: 78
Setting detail: SPECIMEN
Discharge: HOME | End: 2023-05-02
Payer: COMMERCIAL

## 2023-05-02 LAB
ALBUMIN SERPL-MCNC: 3.3 GM/DL (ref 3.4–5)
ALP BLD-CCNC: 61 IU/L (ref 40–128)
ALT SERPL-CCNC: 6 U/L (ref 10–40)
ANION GAP SERPL CALCULATED.3IONS-SCNC: 11 MMOL/L (ref 4–16)
AST SERPL-CCNC: 9 IU/L (ref 15–37)
BILIRUB SERPL-MCNC: 0.2 MG/DL (ref 0–1)
BUN SERPL-MCNC: 19 MG/DL (ref 6–23)
CALCIUM SERPL-MCNC: 8.9 MG/DL (ref 8.3–10.6)
CHLORIDE BLD-SCNC: 110 MMOL/L (ref 99–110)
CO2: 23 MMOL/L (ref 21–32)
CREAT SERPL-MCNC: 0.6 MG/DL (ref 0.6–1.1)
ESTIMATED AVERAGE GLUCOSE: 183 MG/DL
GFR SERPL CREATININE-BSD FRML MDRD: >60 ML/MIN/1.73M2
GLUCOSE SERPL-MCNC: 106 MG/DL (ref 70–99)
HBA1C MFR BLD: 8 % (ref 4.2–6.3)
HCT VFR BLD CALC: 43.8 % (ref 37–47)
HEMOGLOBIN: 13.3 GM/DL (ref 12.5–16)
MCH RBC QN AUTO: 32.5 PG (ref 27–31)
MCHC RBC AUTO-ENTMCNC: 30.4 % (ref 32–36)
MCV RBC AUTO: 107.1 FL (ref 78–100)
PDW BLD-RTO: 13.7 % (ref 11.7–14.9)
PLATELET # BLD: 183 K/CU MM (ref 140–440)
PMV BLD AUTO: 10.9 FL (ref 7.5–11.1)
POTASSIUM SERPL-SCNC: 4.1 MMOL/L (ref 3.5–5.1)
RBC # BLD: 4.09 M/CU MM (ref 4.2–5.4)
SODIUM BLD-SCNC: 144 MMOL/L (ref 135–145)
TOTAL PROTEIN: 5.1 GM/DL (ref 6.4–8.2)
WBC # BLD: 5.4 K/CU MM (ref 4–10.5)

## 2023-05-02 PROCEDURE — 80053 COMPREHEN METABOLIC PANEL: CPT

## 2023-05-02 PROCEDURE — 83036 HEMOGLOBIN GLYCOSYLATED A1C: CPT

## 2023-05-02 PROCEDURE — 85027 COMPLETE CBC AUTOMATED: CPT

## 2023-05-02 PROCEDURE — 36415 COLL VENOUS BLD VENIPUNCTURE: CPT
